# Patient Record
Sex: FEMALE | Race: WHITE | NOT HISPANIC OR LATINO | Employment: FULL TIME | URBAN - NONMETROPOLITAN AREA
[De-identification: names, ages, dates, MRNs, and addresses within clinical notes are randomized per-mention and may not be internally consistent; named-entity substitution may affect disease eponyms.]

---

## 2019-04-09 ENCOUNTER — HISTORICAL (OUTPATIENT)
Dept: ADMINISTRATIVE | Facility: HOSPITAL | Age: 49
End: 2019-04-09

## 2021-01-26 ENCOUNTER — HISTORICAL (OUTPATIENT)
Dept: ADMINISTRATIVE | Facility: HOSPITAL | Age: 51
End: 2021-01-26

## 2021-01-26 LAB
ALBUMIN SERPL-MCNC: 4.4 G/DL (ref 3.8–4.8)
ALBUMIN/GLOB SERPL: 1.9 {RATIO} (ref 1.2–2.2)
ALP SERPL-CCNC: 64 IU/L (ref 39–117)
ALT SERPL-CCNC: 18 IU/L (ref 0–32)
AST SERPL-CCNC: 18 IU/L (ref 0–40)
BASOPHILS # BLD AUTO: 0.2 X10E3/UL (ref 0–0.2)
BASOPHILS NFR BLD AUTO: 2 %
BILIRUB SERPL-MCNC: 0.5 MG/DL (ref 0–1.2)
BUN SERPL-MCNC: 16 MG/DL (ref 6–24)
CALCIUM SERPL-MCNC: 9.7 MG/DL (ref 8.7–10.2)
CHLORIDE SERPL-SCNC: 102 MMOL/L (ref 96–106)
CHOLEST SERPL-MCNC: 132 MG/DL (ref 100–199)
CHOLEST/HDLC SERPL: 1.8 RATIO (ref 0–4.4)
CO2 SERPL-SCNC: 25 MMOL/L (ref 20–29)
CREAT SERPL-MCNC: 0.79 MG/DL (ref 0.57–1)
CREAT/UREA NIT SERPL: 20 (ref 9–23)
EOSINOPHIL # BLD AUTO: 0.2 X10E3/UL (ref 0–0.4)
EOSINOPHIL NFR BLD AUTO: 2 %
ERYTHROCYTE [DISTWIDTH] IN BLOOD BY AUTOMATED COUNT: 13.2 % (ref 11.7–15.4)
GLOBULIN SER-MCNC: 2.3 G/DL (ref 1.5–4.5)
GLUCOSE SERPL-MCNC: 128 MG/DL (ref 65–99)
HBA1C MFR BLD: 6.3 % (ref 4.8–5.6)
HCT VFR BLD AUTO: 44.7 % (ref 34–46.6)
HDLC SERPL-MCNC: 72 MG/DL
HGB BLD-MCNC: 14.7 G/DL (ref 11.1–15.9)
LDLC SERPL CALC-MCNC: 46 MG/DL (ref 0–99)
LYMPHOCYTES # BLD AUTO: 2.3 X10E3/UL (ref 0.7–3.1)
LYMPHOCYTES NFR BLD AUTO: 23 %
MCH RBC QN AUTO: 31.9 PG (ref 26.6–33)
MCHC RBC AUTO-ENTMCNC: 32.9 G/DL (ref 31.5–35.7)
MCV RBC AUTO: 97 FL (ref 79–97)
MONOCYTES # BLD AUTO: 0.7 X10E3/UL (ref 0.1–0.9)
MONOCYTES NFR BLD AUTO: 7 %
NEUTROPHILS # BLD AUTO: 6.6 X10E3/UL (ref 1.4–7)
NEUTROPHILS NFR BLD AUTO: 66 %
PLATELET # BLD AUTO: 350 X10E3/UL (ref 150–450)
POTASSIUM SERPL-SCNC: 4.3 MMOL/L (ref 3.5–5.2)
PROT SERPL-MCNC: 6.7 G/DL (ref 6–8.5)
RBC # BLD AUTO: 4.61 X10(6)/MCL (ref 3.77–5.28)
SODIUM SERPL-SCNC: 140 MMOL/L (ref 134–144)
TRIGL SERPL-MCNC: 71 MG/DL (ref 0–149)
TSH SERPL-ACNC: 1.81 MIU/ML (ref 0.45–4.5)
VLDLC SERPL CALC-MCNC: 14 MG/DL (ref 5–40)
WBC # SPEC AUTO: 10.1 X10E3/UL (ref 3.4–10.8)

## 2022-03-09 DIAGNOSIS — Z12.11 SCREENING FOR MALIGNANT NEOPLASM OF COLON: Primary | ICD-10-CM

## 2022-04-11 ENCOUNTER — HISTORICAL (OUTPATIENT)
Dept: ADMINISTRATIVE | Facility: HOSPITAL | Age: 52
End: 2022-04-11
Payer: COMMERCIAL

## 2022-04-29 VITALS
WEIGHT: 151 LBS | OXYGEN SATURATION: 97 % | HEIGHT: 67 IN | DIASTOLIC BLOOD PRESSURE: 80 MMHG | BODY MASS INDEX: 23.7 KG/M2 | SYSTOLIC BLOOD PRESSURE: 130 MMHG

## 2022-12-19 ENCOUNTER — TELEPHONE (OUTPATIENT)
Dept: GASTROENTEROLOGY | Facility: CLINIC | Age: 52
End: 2022-12-19
Payer: COMMERCIAL

## 2022-12-19 NOTE — TELEPHONE ENCOUNTER
----- Message from Corrine Arthur sent at 2022  3:00 PM CDT -----  Regardinnd referral recd 22, 1st referral recd 22  2nd referral recd 22, 1st referral recd 22    Please call pt to Direct Schedule New Pt RMM    Thanks,  Corrine

## 2022-12-19 NOTE — TELEPHONE ENCOUNTER
Reached out to pt to schedule colon w/ RMM and update Epic chart. No answer. LVM for pt to return call - VL

## 2023-02-03 DIAGNOSIS — F41.1 GAD (GENERALIZED ANXIETY DISORDER): Primary | ICD-10-CM

## 2023-02-03 DIAGNOSIS — E78.2 MIXED HYPERLIPIDEMIA: ICD-10-CM

## 2023-02-03 RX ORDER — BUPROPION HYDROCHLORIDE 150 MG/1
150 TABLET ORAL DAILY
COMMUNITY
Start: 2022-12-05 | End: 2023-02-03 | Stop reason: SDUPTHER

## 2023-02-03 RX ORDER — ROSUVASTATIN CALCIUM 5 MG/1
5 TABLET, COATED ORAL DAILY
Qty: 30 TABLET | Refills: 11 | Status: SHIPPED | OUTPATIENT
Start: 2023-02-03 | End: 2023-04-21 | Stop reason: SDUPTHER

## 2023-02-03 RX ORDER — ROSUVASTATIN CALCIUM 5 MG/1
5 TABLET, COATED ORAL
COMMUNITY
Start: 2022-12-05 | End: 2023-02-03 | Stop reason: SDUPTHER

## 2023-02-03 RX ORDER — MELOXICAM 7.5 MG/1
7.5 TABLET ORAL 2 TIMES DAILY
COMMUNITY
Start: 2022-12-05 | End: 2023-03-02

## 2023-02-03 RX ORDER — BUPROPION HYDROCHLORIDE 150 MG/1
150 TABLET ORAL DAILY
Qty: 30 TABLET | Refills: 11 | Status: SHIPPED | OUTPATIENT
Start: 2023-02-03 | End: 2023-04-21 | Stop reason: SDUPTHER

## 2023-03-24 PROCEDURE — 80061 LIPID PANEL: CPT | Performed by: FAMILY MEDICINE

## 2023-03-24 PROCEDURE — 83036 HEMOGLOBIN GLYCOSYLATED A1C: CPT | Performed by: FAMILY MEDICINE

## 2023-03-24 PROCEDURE — 80053 COMPREHEN METABOLIC PANEL: CPT | Performed by: FAMILY MEDICINE

## 2023-03-24 PROCEDURE — 82607 VITAMIN B-12: CPT | Performed by: FAMILY MEDICINE

## 2023-03-24 PROCEDURE — 85025 COMPLETE CBC W/AUTO DIFF WBC: CPT | Performed by: FAMILY MEDICINE

## 2023-04-21 ENCOUNTER — OFFICE VISIT (OUTPATIENT)
Dept: FAMILY MEDICINE | Facility: CLINIC | Age: 53
End: 2023-04-21
Payer: COMMERCIAL

## 2023-04-21 VITALS
SYSTOLIC BLOOD PRESSURE: 120 MMHG | DIASTOLIC BLOOD PRESSURE: 68 MMHG | TEMPERATURE: 97 F | BODY MASS INDEX: 24.8 KG/M2 | WEIGHT: 158 LBS | HEIGHT: 67 IN

## 2023-04-21 DIAGNOSIS — E78.00 HYPERCHOLESTEROLEMIA: ICD-10-CM

## 2023-04-21 DIAGNOSIS — M77.9 INFLAMMATION AROUND JOINT: ICD-10-CM

## 2023-04-21 DIAGNOSIS — E78.2 MIXED HYPERLIPIDEMIA: ICD-10-CM

## 2023-04-21 DIAGNOSIS — F41.1 GAD (GENERALIZED ANXIETY DISORDER): ICD-10-CM

## 2023-04-21 DIAGNOSIS — M65.342 TRIGGER FINGER, LEFT RING FINGER: ICD-10-CM

## 2023-04-21 DIAGNOSIS — E11.69 TYPE 2 DIABETES MELLITUS WITH HYPERCHOLESTEROLEMIA: Primary | ICD-10-CM

## 2023-04-21 DIAGNOSIS — I10 PRIMARY HYPERTENSION: ICD-10-CM

## 2023-04-21 DIAGNOSIS — E11.9 CONTROLLED TYPE 2 DIABETES MELLITUS WITHOUT COMPLICATION, WITH LONG-TERM CURRENT USE OF INSULIN: ICD-10-CM

## 2023-04-21 DIAGNOSIS — Z79.4 CONTROLLED TYPE 2 DIABETES MELLITUS WITHOUT COMPLICATION, WITH LONG-TERM CURRENT USE OF INSULIN: ICD-10-CM

## 2023-04-21 DIAGNOSIS — E66.9 OBESITY WITHOUT SERIOUS COMORBIDITY, UNSPECIFIED CLASSIFICATION, UNSPECIFIED OBESITY TYPE: ICD-10-CM

## 2023-04-21 DIAGNOSIS — E78.00 TYPE 2 DIABETES MELLITUS WITH HYPERCHOLESTEROLEMIA: Primary | ICD-10-CM

## 2023-04-21 PROCEDURE — 99214 OFFICE O/P EST MOD 30 MIN: CPT | Mod: 25,,, | Performed by: FAMILY MEDICINE

## 2023-04-21 PROCEDURE — 20550 NJX 1 TENDON SHEATH/LIGAMENT: CPT | Mod: F4,,, | Performed by: FAMILY MEDICINE

## 2023-04-21 PROCEDURE — 99214 PR OFFICE/OUTPT VISIT, EST, LEVL IV, 30-39 MIN: ICD-10-PCS | Mod: 25,,, | Performed by: FAMILY MEDICINE

## 2023-04-21 PROCEDURE — 20550 PR INJECT TENDON SHEATH/LIGAMENT: ICD-10-PCS | Mod: F4,,, | Performed by: FAMILY MEDICINE

## 2023-04-21 RX ORDER — MELOXICAM 7.5 MG/1
7.5 TABLET ORAL 2 TIMES DAILY
Qty: 60 TABLET | Refills: 11 | Status: SHIPPED | OUTPATIENT
Start: 2023-04-21 | End: 2023-05-03 | Stop reason: SDUPTHER

## 2023-04-21 RX ORDER — ROSUVASTATIN CALCIUM 5 MG/1
5 TABLET, COATED ORAL DAILY
Qty: 30 TABLET | Refills: 11 | Status: SHIPPED | OUTPATIENT
Start: 2023-04-21 | End: 2023-05-03 | Stop reason: SDUPTHER

## 2023-04-21 RX ORDER — ESTRADIOL 1 MG/1
1 TABLET ORAL
COMMUNITY
Start: 2023-03-30 | End: 2023-11-06

## 2023-04-21 RX ORDER — BUPROPION HYDROCHLORIDE 150 MG/1
150 TABLET ORAL DAILY
Qty: 30 TABLET | Refills: 11 | Status: SHIPPED | OUTPATIENT
Start: 2023-04-21 | End: 2023-05-03 | Stop reason: SDUPTHER

## 2023-04-21 RX ORDER — PHENTERMINE HYDROCHLORIDE 37.5 MG/1
37.5 TABLET ORAL EVERY MORNING
Qty: 30 TABLET | Refills: 2 | Status: SHIPPED | OUTPATIENT
Start: 2023-04-21 | End: 2023-07-27 | Stop reason: SDUPTHER

## 2023-04-21 RX ORDER — METFORMIN HYDROCHLORIDE 500 MG/1
500 TABLET, EXTENDED RELEASE ORAL DAILY
Qty: 30 TABLET | Refills: 11 | Status: SHIPPED | OUTPATIENT
Start: 2023-04-21 | End: 2023-05-03 | Stop reason: SDUPTHER

## 2023-04-21 NOTE — PROGRESS NOTES
"SUBJECTIVE:  HPI    Phoebe Arthur is a 52 y.o. female here for Follow-up (6 months labs).  She is doing well.  No problems, questions, concerns other than recurrent triggering of the left ring finger.  She had an injection performed in August of 2022 and has had no recurrence until recently.  She requests another injection today.    She denies any chest pain, shortness a breath, abdominal pain.      Phoebe's allergies, medications, history, and problem list were updated as appropriate.    ROS:  Pertinent ROS as above, otherwise negative    OBJECTIVE:  Vital signs  Visit Vitals  /68 (BP Location: Left arm)   Temp 96.7 °F (35.9 °C) (Temporal)   Ht 5' 6.54" (1.69 m)   Wt 71.7 kg (158 lb)   BMI 25.09 kg/m²          PHYSICAL EXAM:  General:  Awake, alert, no acute distress   Musculoskeletal:  Left ring finger with palpable flexor nodule just proximal to the metacarpal head clicking and triggering present.    Chemistry:  Lab Results   Component Value Date     03/24/2023    K 5.2 (H) 03/24/2023    BUN 16.0 03/24/2023    CREATININE 0.60 (L) 03/24/2023    EGFRNORACEVR >90 03/24/2023    GLUCOSE 129 (H) 03/24/2023    CALCIUM 10.0 03/24/2023    ALKPHOS 81 03/24/2023    AST 31 03/24/2023    ALT 22 03/24/2023    TSH 1.810 01/26/2021        Lab Results   Component Value Date    HGBA1C 5.8 03/24/2023        Hematology:  Lab Results   Component Value Date    WBC 10.0 03/24/2023    HGB 14.6 03/24/2023    MCV 91.1 03/24/2023     03/24/2023       Lipid Panel:  Lab Results   Component Value Date    CHOL 137 03/24/2023    HDL 91 (H) 03/24/2023    DLDL 32.3 03/24/2023    TRIG 64 03/24/2023    TOTALCHOLEST 1.8 01/26/2021        ASSESSMENT/PLAN:  1. Type 2 diabetes mellitus with hypercholesterolemia  Overview:  Lab Results   Component Value Date    HGBA1C 5.8 03/24/2023         Assessment & Plan:  On metformin  mg daily    Orders:  -     Hemoglobin A1C; Future; Expected date: 10/21/2023  -     Vitamin B12; " Future; Expected date: 10/21/2023    2. Hypercholesterolemia  Overview:  Lab Results   Component Value Date    CHOL 137 03/24/2023    CHOL 132 01/26/2021     Lab Results   Component Value Date    HDL 91 (H) 03/24/2023    HDL 72 01/26/2021     No results found for: LDLCALC  Lab Results   Component Value Date    DLDL 32.3 03/24/2023     Lab Results   Component Value Date    TRIG 64 03/24/2023    TRIG 71 01/26/2021       f1 No results found for: CHOLHDL      Assessment & Plan:  LDL cholesterol at goal of less than 70 on rosuvastatin 5 mg daily    Orders:  -     Lipid Panel; Future; Expected date: 10/21/2023  -     Vitamin B12; Future; Expected date: 10/21/2023    3. Primary hypertension  Assessment & Plan:  Controlled without meds at this time    Orders:  -     Comprehensive Metabolic Panel; Future; Expected date: 10/21/2023  -     CBC Auto Differential; Future; Expected date: 10/21/2023  -     Vitamin B12; Future; Expected date: 10/21/2023    4. Trigger finger, left ring finger  Assessment & Plan:  Injected today    Aftercare instructions discussed      5. MONISHA (generalized anxiety disorder)  -     buPROPion (WELLBUTRIN XL) 150 MG TB24 tablet; Take 1 tablet (150 mg total) by mouth once daily.  Dispense: 30 tablet; Refill: 11    6. Inflammation around joint  -     meloxicam (MOBIC) 7.5 MG tablet; Take 1 tablet (7.5 mg total) by mouth 2 (two) times daily.  Dispense: 60 tablet; Refill: 11    7. Controlled type 2 diabetes mellitus without complication, with long-term current use of insulin  -     metFORMIN (GLUCOPHAGE-XR) 500 MG ER 24hr tablet; Take 1 tablet (500 mg total) by mouth once daily.  Dispense: 30 tablet; Refill: 11    8. Obesity without serious comorbidity, unspecified classification, unspecified obesity type  -     phentermine (ADIPEX-P) 37.5 mg tablet; Take 1 tablet (37.5 mg total) by mouth every morning.  Dispense: 30 tablet; Refill: 2    9. Mixed hyperlipidemia  -     rosuvastatin (CRESTOR) 5 MG tablet; Take  1 tablet (5 mg total) by mouth once daily.  Dispense: 30 tablet; Refill: 11            Follow Up:  Follow up in about 6 months (around 10/21/2023) for Fasting labs, Follow-up.

## 2023-04-21 NOTE — PROCEDURES
Left ring finger palmar surface prepped in sterile fashion.  Injected flexor tendon nodule with 1 mL of lidocaine 1% plain and 40 mg of Depo-Medrol.  Patient tolerated procedure well.

## 2023-04-24 RX ORDER — METHYLPREDNISOLONE ACETATE 40 MG/ML
40 INJECTION, SUSPENSION INTRA-ARTICULAR; INTRALESIONAL; INTRAMUSCULAR; SOFT TISSUE
Status: SHIPPED | OUTPATIENT
Start: 2023-04-24

## 2023-05-03 DIAGNOSIS — E66.9 OBESITY WITHOUT SERIOUS COMORBIDITY, UNSPECIFIED CLASSIFICATION, UNSPECIFIED OBESITY TYPE: ICD-10-CM

## 2023-05-03 DIAGNOSIS — Z79.4 CONTROLLED TYPE 2 DIABETES MELLITUS WITHOUT COMPLICATION, WITH LONG-TERM CURRENT USE OF INSULIN: ICD-10-CM

## 2023-05-03 DIAGNOSIS — E11.9 CONTROLLED TYPE 2 DIABETES MELLITUS WITHOUT COMPLICATION, WITH LONG-TERM CURRENT USE OF INSULIN: ICD-10-CM

## 2023-05-03 DIAGNOSIS — E78.2 MIXED HYPERLIPIDEMIA: ICD-10-CM

## 2023-05-03 DIAGNOSIS — F41.1 GAD (GENERALIZED ANXIETY DISORDER): ICD-10-CM

## 2023-05-03 DIAGNOSIS — M77.9 INFLAMMATION AROUND JOINT: ICD-10-CM

## 2023-05-03 RX ORDER — BUPROPION HYDROCHLORIDE 150 MG/1
150 TABLET ORAL DAILY
Qty: 30 TABLET | Refills: 11 | Status: SHIPPED | OUTPATIENT
Start: 2023-05-03 | End: 2023-06-05 | Stop reason: SDUPTHER

## 2023-05-03 RX ORDER — ROSUVASTATIN CALCIUM 5 MG/1
5 TABLET, COATED ORAL DAILY
Qty: 30 TABLET | Refills: 11 | Status: SHIPPED | OUTPATIENT
Start: 2023-05-03 | End: 2023-06-05 | Stop reason: SDUPTHER

## 2023-05-03 RX ORDER — PHENTERMINE HYDROCHLORIDE 37.5 MG/1
37.5 TABLET ORAL EVERY MORNING
Qty: 30 TABLET | Refills: 2 | OUTPATIENT
Start: 2023-05-03

## 2023-05-03 RX ORDER — METFORMIN HYDROCHLORIDE 500 MG/1
500 TABLET, EXTENDED RELEASE ORAL DAILY
Qty: 30 TABLET | Refills: 11 | Status: SHIPPED | OUTPATIENT
Start: 2023-05-03 | End: 2023-06-05 | Stop reason: SDUPTHER

## 2023-05-03 RX ORDER — MELOXICAM 7.5 MG/1
7.5 TABLET ORAL 2 TIMES DAILY
Qty: 60 TABLET | Refills: 11 | Status: SHIPPED | OUTPATIENT
Start: 2023-05-03 | End: 2023-06-05 | Stop reason: SDUPTHER

## 2023-06-05 DIAGNOSIS — F41.1 GAD (GENERALIZED ANXIETY DISORDER): ICD-10-CM

## 2023-06-05 DIAGNOSIS — E78.2 MIXED HYPERLIPIDEMIA: ICD-10-CM

## 2023-06-05 DIAGNOSIS — Z79.4 CONTROLLED TYPE 2 DIABETES MELLITUS WITHOUT COMPLICATION, WITH LONG-TERM CURRENT USE OF INSULIN: ICD-10-CM

## 2023-06-05 DIAGNOSIS — E11.9 CONTROLLED TYPE 2 DIABETES MELLITUS WITHOUT COMPLICATION, WITH LONG-TERM CURRENT USE OF INSULIN: ICD-10-CM

## 2023-06-05 DIAGNOSIS — E66.9 OBESITY WITHOUT SERIOUS COMORBIDITY, UNSPECIFIED CLASSIFICATION, UNSPECIFIED OBESITY TYPE: ICD-10-CM

## 2023-06-05 DIAGNOSIS — M77.9 INFLAMMATION AROUND JOINT: ICD-10-CM

## 2023-06-05 RX ORDER — BUPROPION HYDROCHLORIDE 150 MG/1
150 TABLET ORAL DAILY
Qty: 30 TABLET | Refills: 11 | Status: SHIPPED | OUTPATIENT
Start: 2023-06-05

## 2023-06-05 RX ORDER — METFORMIN HYDROCHLORIDE 500 MG/1
500 TABLET, EXTENDED RELEASE ORAL DAILY
Qty: 30 TABLET | Refills: 11 | Status: SHIPPED | OUTPATIENT
Start: 2023-06-05

## 2023-06-05 RX ORDER — ROSUVASTATIN CALCIUM 5 MG/1
5 TABLET, COATED ORAL DAILY
Qty: 30 TABLET | Refills: 11 | Status: SHIPPED | OUTPATIENT
Start: 2023-06-05

## 2023-06-05 RX ORDER — PHENTERMINE HYDROCHLORIDE 37.5 MG/1
37.5 TABLET ORAL EVERY MORNING
Qty: 30 TABLET | Refills: 2 | Status: CANCELLED | OUTPATIENT
Start: 2023-06-05

## 2023-06-05 RX ORDER — MELOXICAM 7.5 MG/1
7.5 TABLET ORAL 2 TIMES DAILY
Qty: 60 TABLET | Refills: 11 | Status: SHIPPED | OUTPATIENT
Start: 2023-06-05

## 2023-07-27 DIAGNOSIS — E66.9 OBESITY WITHOUT SERIOUS COMORBIDITY, UNSPECIFIED CLASSIFICATION, UNSPECIFIED OBESITY TYPE: ICD-10-CM

## 2023-07-27 RX ORDER — PHENTERMINE HYDROCHLORIDE 37.5 MG/1
37.5 TABLET ORAL EVERY MORNING
Qty: 30 TABLET | Refills: 2 | Status: SHIPPED | OUTPATIENT
Start: 2023-07-27 | End: 2023-12-07

## 2023-10-20 ENCOUNTER — OFFICE VISIT (OUTPATIENT)
Dept: FAMILY MEDICINE | Facility: CLINIC | Age: 53
End: 2023-10-20
Payer: COMMERCIAL

## 2023-10-20 VITALS
HEART RATE: 81 BPM | HEIGHT: 67 IN | DIASTOLIC BLOOD PRESSURE: 72 MMHG | TEMPERATURE: 98 F | WEIGHT: 156.19 LBS | BODY MASS INDEX: 24.52 KG/M2 | SYSTOLIC BLOOD PRESSURE: 128 MMHG | OXYGEN SATURATION: 98 %

## 2023-10-20 DIAGNOSIS — Z12.31 SCREENING MAMMOGRAM FOR BREAST CANCER: ICD-10-CM

## 2023-10-20 DIAGNOSIS — Z12.11 SCREENING FOR COLON CANCER: ICD-10-CM

## 2023-10-20 DIAGNOSIS — E78.00 TYPE 2 DIABETES MELLITUS WITH HYPERCHOLESTEROLEMIA: ICD-10-CM

## 2023-10-20 DIAGNOSIS — M65.342 TRIGGER FINGER, LEFT RING FINGER: Primary | ICD-10-CM

## 2023-10-20 DIAGNOSIS — E11.69 TYPE 2 DIABETES MELLITUS WITH HYPERCHOLESTEROLEMIA: ICD-10-CM

## 2023-10-20 PROCEDURE — 3074F SYST BP LT 130 MM HG: CPT | Mod: CPTII,,, | Performed by: FAMILY MEDICINE

## 2023-10-20 PROCEDURE — 1159F MED LIST DOCD IN RCRD: CPT | Mod: CPTII,,, | Performed by: FAMILY MEDICINE

## 2023-10-20 PROCEDURE — 20550 TENDON SHEATH: ICD-10-PCS | Mod: LT,,, | Performed by: FAMILY MEDICINE

## 2023-10-20 PROCEDURE — 1159F PR MEDICATION LIST DOCUMENTED IN MEDICAL RECORD: ICD-10-PCS | Mod: CPTII,,, | Performed by: FAMILY MEDICINE

## 2023-10-20 PROCEDURE — 99213 PR OFFICE/OUTPT VISIT, EST, LEVL III, 20-29 MIN: ICD-10-PCS | Mod: 25,,, | Performed by: FAMILY MEDICINE

## 2023-10-20 PROCEDURE — 3044F HG A1C LEVEL LT 7.0%: CPT | Mod: CPTII,,, | Performed by: FAMILY MEDICINE

## 2023-10-20 PROCEDURE — 99213 OFFICE O/P EST LOW 20 MIN: CPT | Mod: 25,,, | Performed by: FAMILY MEDICINE

## 2023-10-20 PROCEDURE — 3008F PR BODY MASS INDEX (BMI) DOCUMENTED: ICD-10-PCS | Mod: CPTII,,, | Performed by: FAMILY MEDICINE

## 2023-10-20 PROCEDURE — 3044F PR MOST RECENT HEMOGLOBIN A1C LEVEL <7.0%: ICD-10-PCS | Mod: CPTII,,, | Performed by: FAMILY MEDICINE

## 2023-10-20 PROCEDURE — 1160F RVW MEDS BY RX/DR IN RCRD: CPT | Mod: CPTII,,, | Performed by: FAMILY MEDICINE

## 2023-10-20 PROCEDURE — 3078F PR MOST RECENT DIASTOLIC BLOOD PRESSURE < 80 MM HG: ICD-10-PCS | Mod: CPTII,,, | Performed by: FAMILY MEDICINE

## 2023-10-20 PROCEDURE — 1160F PR REVIEW ALL MEDS BY PRESCRIBER/CLIN PHARMACIST DOCUMENTED: ICD-10-PCS | Mod: CPTII,,, | Performed by: FAMILY MEDICINE

## 2023-10-20 PROCEDURE — 20550 NJX 1 TENDON SHEATH/LIGAMENT: CPT | Mod: LT,,, | Performed by: FAMILY MEDICINE

## 2023-10-20 PROCEDURE — 3074F PR MOST RECENT SYSTOLIC BLOOD PRESSURE < 130 MM HG: ICD-10-PCS | Mod: CPTII,,, | Performed by: FAMILY MEDICINE

## 2023-10-20 PROCEDURE — 3078F DIAST BP <80 MM HG: CPT | Mod: CPTII,,, | Performed by: FAMILY MEDICINE

## 2023-10-20 PROCEDURE — 3008F BODY MASS INDEX DOCD: CPT | Mod: CPTII,,, | Performed by: FAMILY MEDICINE

## 2023-10-20 NOTE — PROGRESS NOTES
"SUBJECTIVE:  HPI    Phoebe Arthur is a 52 y.o. female here for Finger Pain (Left ring finger ).  She had really good relief of her trigger finger of the left ring finger after an injection in April.  However, she began having pain and triggering again within the last 1-2 weeks.      Terrys allergies, medications, history, and problem list were updated as appropriate.    ROS:  Pertinent ROS as above, otherwise negative    OBJECTIVE:  Vital signs  Visit Vitals  /72 (BP Location: Left arm, Patient Position: Sitting, BP Method: Medium (Manual))   Pulse 81   Temp 98.4 °F (36.9 °C) (Oral)   Ht 5' 6.54" (1.69 m)   Wt 70.9 kg (156 lb 3.2 oz)   SpO2 98%   BMI 24.81 kg/m²          PHYSICAL EXAM:  Left hand:  Palpable flexor nodule and tenderness to palpation with triggering of the left ring finger.      ASSESSMENT/PLAN:  1. Trigger finger, left ring finger  Assessment & Plan:  Injected today    Aftercare instructions discussed    Orders:  -     Tendon Sheath    2. Screening for colon cancer  -     Ambulatory referral/consult to [facility] COLONOSCOPY; Future; Expected date: 10/27/2023    3. Screening mammogram for breast cancer  -     Mammo Digital Screening Bilat w/ [facility]; Future; Expected date: 10/20/2023    4. Type 2 diabetes mellitus with hypercholesterolemia  Overview:  Lab Results   Component Value Date    HGBA1C 5.8 03/24/2023         Orders:  -     Ambulatory referral/consult to Optometry; Future; Expected date: 10/27/2023      "

## 2023-10-20 NOTE — PROCEDURES
Tendon Sheath    Date/Time: 10/20/2023 10:32 AM    Performed by: Hernan Barr MD  Authorized by: Hernan Barr MD    Consent Done?:  Yes (Verbal)  Local anesthetic:  Lidocaine 1% without epinephrine  Anesthetic total (ml):  1    Location:  Ring finger  Site:  L ring flexor tendon sheath  Needle size:  25 G

## 2023-10-23 ENCOUNTER — PATIENT MESSAGE (OUTPATIENT)
Dept: FAMILY MEDICINE | Facility: CLINIC | Age: 53
End: 2023-10-23
Payer: COMMERCIAL

## 2023-10-30 LAB
ALBUMIN SERPL-MCNC: 4.5 G/DL (ref 3.8–4.9)
ALBUMIN/GLOB SERPL: 2.3 {RATIO} (ref 1.2–2.2)
ALP SERPL-CCNC: 64 IU/L (ref 44–121)
ALT SERPL-CCNC: 29 IU/L (ref 0–32)
AST SERPL-CCNC: 27 IU/L (ref 0–40)
BASOPHILS # BLD AUTO: 0.2 X10E3/UL (ref 0–0.2)
BASOPHILS NFR BLD AUTO: 1 %
BILIRUB SERPL-MCNC: 0.3 MG/DL (ref 0–1.2)
BUN SERPL-MCNC: 18 MG/DL (ref 6–24)
BUN/CREAT SERPL: 25 (ref 9–23)
CALCIUM SERPL-MCNC: 9.2 MG/DL (ref 8.7–10.2)
CHLORIDE SERPL-SCNC: 100 MMOL/L (ref 96–106)
CHOLEST SERPL-MCNC: 120 MG/DL (ref 100–199)
CO2 SERPL-SCNC: 25 MMOL/L (ref 20–29)
CREAT SERPL-MCNC: 0.72 MG/DL (ref 0.57–1)
EOSINOPHIL # BLD AUTO: 0.3 X10E3/UL (ref 0–0.4)
EOSINOPHIL NFR BLD AUTO: 2 %
ERYTHROCYTE [DISTWIDTH] IN BLOOD BY AUTOMATED COUNT: 13.9 % (ref 11.7–15.4)
EST. GFR  (NO RACE VARIABLE): 101 ML/MIN/1.73
GLOBULIN SER CALC-MCNC: 2 G/DL (ref 1.5–4.5)
GLUCOSE SERPL-MCNC: 85 MG/DL (ref 70–99)
HBA1C MFR BLD: 5.8 % (ref 4.8–5.6)
HCT VFR BLD AUTO: 43.8 % (ref 34–46.6)
HDLC SERPL-MCNC: 76 MG/DL
HGB BLD-MCNC: 14.2 G/DL (ref 11.1–15.9)
IMM GRANULOCYTES NFR BLD AUTO: 1 %
LDLC SERPL CALC-MCNC: 30 MG/DL (ref 0–99)
LYMPHOCYTES # BLD AUTO: 3.1 X10E3/UL (ref 0.7–3.1)
LYMPHOCYTES NFR BLD AUTO: 25 %
MCH RBC QN AUTO: 31.6 PG (ref 26.6–33)
MCHC RBC AUTO-ENTMCNC: 32.4 G/DL (ref 31.5–35.7)
MCV RBC AUTO: 97 FL (ref 79–97)
MONOCYTES # BLD AUTO: 1 X10E3/UL (ref 0.1–0.9)
MONOCYTES NFR BLD AUTO: 8 %
NEUTROPHILS # BLD AUTO: 7.9 X10E3/UL (ref 1.4–7)
NEUTROPHILS NFR BLD AUTO: 63 %
PLATELET # BLD AUTO: 320 X10E3/UL (ref 150–450)
POTASSIUM SERPL-SCNC: 4.4 MMOL/L (ref 3.5–5.2)
PROT SERPL-MCNC: 6.5 G/DL (ref 6–8.5)
RBC # BLD AUTO: 4.5 X10E6/UL (ref 3.77–5.28)
SODIUM SERPL-SCNC: 140 MMOL/L (ref 134–144)
TRIGL SERPL-MCNC: 70 MG/DL (ref 0–149)
VIT B12 SERPL-MCNC: 527 PG/ML (ref 232–1245)
VLDLC SERPL CALC-MCNC: 14 MG/DL (ref 5–40)
WBC # BLD AUTO: 12.6 X10E3/UL (ref 3.4–10.8)

## 2023-11-02 DIAGNOSIS — Z79.890 HORMONE REPLACEMENT THERAPY: Primary | ICD-10-CM

## 2023-11-06 RX ORDER — ESTRADIOL 1 MG/1
1 TABLET ORAL
Qty: 30 TABLET | Refills: 11 | Status: SHIPPED | OUTPATIENT
Start: 2023-11-06 | End: 2024-01-29 | Stop reason: SDUPTHER

## 2023-12-07 DIAGNOSIS — E66.9 OBESITY WITHOUT SERIOUS COMORBIDITY, UNSPECIFIED CLASSIFICATION, UNSPECIFIED OBESITY TYPE: ICD-10-CM

## 2023-12-07 RX ORDER — PHENTERMINE HYDROCHLORIDE 37.5 MG/1
37.5 TABLET ORAL EVERY MORNING
Qty: 30 TABLET | Refills: 2 | Status: SHIPPED | OUTPATIENT
Start: 2023-12-07 | End: 2024-03-01

## 2023-12-15 LAB
LEFT EYE DM RETINOPATHY: NEGATIVE
RIGHT EYE DM RETINOPATHY: NEGATIVE

## 2023-12-18 ENCOUNTER — DOCUMENTATION ONLY (OUTPATIENT)
Dept: ADMINISTRATIVE | Facility: HOSPITAL | Age: 53
End: 2023-12-18
Payer: COMMERCIAL

## 2023-12-18 NOTE — PROGRESS NOTES
Population Health Chart Review & Patient Outreach Details    Health Maintenance Topics Addressed and Outreach Outcomes / Actions Taken:            Diabetic Eye Exam []  Eye Exam Screening Order Placed    []  Eye Camera Scheduled or Optometry/Ophthalmology Referral Placed    []  External Records Requested & Care Team Updated if Applicable    [x]  External Records Uploaded, Care Team Updated, & History Updated if Applicable    []  Patient Declined Scheduling Eye Exam    []  Patient Will Schedule with External Provider & Care Team Updated if Applicable            Lesli Arthur MA - Panel Care Coordinator

## 2023-12-28 ENCOUNTER — TELEPHONE (OUTPATIENT)
Dept: FAMILY MEDICINE | Facility: CLINIC | Age: 53
End: 2023-12-28
Payer: COMMERCIAL

## 2023-12-29 ENCOUNTER — HOSPITAL ENCOUNTER (OUTPATIENT)
Dept: RADIOLOGY | Facility: HOSPITAL | Age: 53
Discharge: HOME | End: 2023-12-29
Attending: FAMILY MEDICINE
Payer: COMMERCIAL

## 2023-12-29 DIAGNOSIS — Z12.31 SCREENING MAMMOGRAM FOR BREAST CANCER: ICD-10-CM

## 2023-12-29 PROCEDURE — 77067 SCR MAMMO BI INCL CAD: CPT | Mod: TC

## 2024-01-09 ENCOUNTER — TELEPHONE (OUTPATIENT)
Dept: FAMILY MEDICINE | Facility: CLINIC | Age: 54
End: 2024-01-09
Payer: COMMERCIAL

## 2024-01-09 NOTE — TELEPHONE ENCOUNTER
----- Message from Hernan Barr MD sent at 1/9/2024 11:58 AM CST -----  Mammogram okay.  Repeat 1 year.

## 2024-01-17 NOTE — PROGRESS NOTES
Chief Complaint: Annual exam    Chief Complaint   Patient presents with    Well Woman       HPI:   53 y.o. F  presents for an annual gyn exam. S/p VINNIE. Denies vaginal bleeding. Currently taking Estradiol 1mg for HRT. C/o vaginal dryness, hot flashes, and night sweats.     MM2024 negative  Colonoscopy:     FmHx: BRANDYN arreola breast cancer. Denies uterine, ovarian, and colon cancers.     MENOPAUSAL:  Age at menarche: 13  Age at menopause: 48  Hysterectomy:  Yes  Last pap: 10/22/2021 WNL  H/o abnl pap: none  Postcoital Bleeding: No  Sexually Active: yes   Dyspareunia: No  H/o STI: No   HRT: Yes, Estradiol 1mg  MM2024 Benign  H/o abnl MMG: none   Colonoscopy: yes,        Family History   Problem Relation Age of Onset    Breast cancer Maternal Aunt 50    Colon cancer Neg Hx     Ovarian cancer Neg Hx     Uterine cancer Neg Hx     Cervical cancer Neg Hx          No past medical history on file.  Past Surgical History:   Procedure Laterality Date    CHOLECYSTECTOMY      TOTAL ABDOMINAL HYSTERECTOMY         Current Outpatient Medications:     buPROPion (WELLBUTRIN XL) 150 MG TB24 tablet, Take 1 tablet (150 mg total) by mouth once daily., Disp: 30 tablet, Rfl: 11    estradioL (ESTRACE) 1 MG tablet, TAKE ONE TABLET BY MOUTH EVERY DAY, Disp: 30 tablet, Rfl: 11    meloxicam (MOBIC) 7.5 MG tablet, Take 1 tablet (7.5 mg total) by mouth 2 (two) times daily., Disp: 60 tablet, Rfl: 11    metFORMIN (GLUCOPHAGE-XR) 500 MG ER 24hr tablet, Take 1 tablet (500 mg total) by mouth once daily., Disp: 30 tablet, Rfl: 11    phentermine (ADIPEX-P) 37.5 mg tablet, TAKE ONE TABLET BY MOUTH IN THE MORNING EVERY DAY, Disp: 30 tablet, Rfl: 2    rosuvastatin (CRESTOR) 5 MG tablet, Take 1 tablet (5 mg total) by mouth once daily., Disp: 30 tablet, Rfl: 11    Current Facility-Administered Medications:     methylPREDNISolone acetate injection 40 mg, 40 mg, Intra-articular, 1 time in Clinic/HOD, Hernan Barr,  "MD    Review of patient's allergies indicates:   Allergen Reactions    Iodine Hives       Social History     Tobacco Use    Smoking status: Former     Current packs/day: 0.00     Types: Cigarettes     Quit date: 2018     Years since quittin.0     Passive exposure: Current    Smokeless tobacco: Never   Substance Use Topics    Alcohol use: Yes     Alcohol/week: 3.0 standard drinks of alcohol     Types: 3 Drinks containing 0.5 oz of alcohol per week    Drug use: Never       Review of Systems:  General/Constitutional: Chills denies. Fatigue/weakness denies. Fever denies. Night sweats admits. Hot flashes admits    Respiratory: Cough denies. Hemoptysis denies. SOB denies. Sputum production denies. Wheezing denies .   Cardiovascular: Chest pain denies . Dizziness denies. Palpitations denies. Swelling in hands/feet denies    Gastrointestinal: Abdominal pain denies. Blood in stool denies. Constipation denies. Diarrhea denies. Heartburn denies. Nausea denies. Vomiting denies    Genitourinary: Incontinence denies. Blood in urine denies. Frequent urination denies. Painful urination denies. Urinary urgency denies. Nocturia denies    Gynecologic: Irregular menses denies. Heavy bleeding denies. Painful menses denies. Vaginal discharge denies. Vaginal odor denies. Vaginal itching denies. Vaginal lesion denies. Pelvic pain denies. Decreased libido denies. Vulvar lesion denies. Prolapse of genital organs denies. Painful intercourse denies. Postcoital bleeding denies. Vaginal dryness admits  Psychiatric: Depression denies. Anxiety denies     Physical Exam:   Vitals:    24 1535   BP: 130/70   BP Location: Left arm   Patient Position: Sitting   BP Method: Medium (Manual)   Temp: 97.2 °F (36.2 °C)   TempSrc: Temporal   Weight: 71.7 kg (158 lb)   Height: 5' 7" (1.702 m)       Body mass index is 24.75 kg/m².       Chaperone: present.     General appearance: healthy, well-nourished and well-developed     Psychiatric: Orientation " to time, place and person. Normal mood and affect and active, alert     Skin: Appearance: no rashes or lesions.     Neck:   Neck: supple, FROM, trachea midline. and no masses   Thyroid: no enlargement or nodules and non-tender.       Cardiovascular:   Auscultation: RRR and no murmur.   Peripheral Vascular: no varicosities, LLE edema, RLE edema, calf tenderness, and palpable cord and pedal pulses intact.     Lungs:   Respiratory effort: no intercostal retractions or accessory muscle usage.   Auscultation: no wheezing, rales/crackles, or rhonchi and clear to auscultation.     Breast:   Inspection/Palpation: no tenderness, discrete/distinct masses, skin changes, or abnormal secretions. Nipple appearance normal. +FBC changes    Abdomen:   Auscultation/Inspection/Palpation: no hepatomegaly, splenomegaly, masses, tenderness or CVA tenderness and soft, non-distended bowel sounds preset.    Hernia: no palpable hernias.     Female Genitalia:    Vulva: no masses, tenderness or lesions    Bladder/Urethra: no urethral discharge or mass, normal meatus, bladder non-distended.    Vagina: no tenderness, erythema, cystocele, rectocele, abnormal vaginal discharge or vesicle(s) or ulcers. Cuff intact. Atrophic  Bimanual: non tender, no masses.        Lymph Nodes:   Palpation: non tender submandibular nodes, axillary nodes, or inguinal nodes.     Rectal Exam:   Rectum: normal perianal skin.       Assessment:     Patient Active Problem List   Diagnosis    Type 2 diabetes mellitus with hypercholesterolemia    Hypercholesterolemia    Primary hypertension    Trigger finger, left ring finger       Health Maintenance Due   Topic Date Due    Hepatitis C Screening  Never done    COVID-19 Vaccine (1) Never done    Diabetes Urine Screening  Never done    Pneumococcal Vaccines (Age 0-64) (1 of 2 - PCV) Never done    Foot Exam  Never done    HIV Screening  Never done    TETANUS VACCINE  Never done    Colorectal Cancer Screening  Never done     Shingles Vaccine (1 of 2) Never done    Influenza Vaccine (1) 09/01/2023     Health Maintenance Topics with due status: Not Due       Topic Last Completion Date    High Dose Statin 10/20/2023    Lipid Panel 10/24/2023    Hemoglobin A1c 10/24/2023    Eye Exam 12/15/2023    Mammogram 01/05/2024         Plan:    Phoebe was seen today for well woman.    Diagnoses and all orders for this visit:    Abnormal gynecological examination  No PAP   Speculum exam performed.   Reviewed calcium needs, exercise, and prevention of osteoporosis.  Healthy diet  Annual MMG  Discussed breast self-awareness  Colonoscopy q 10 yrs  Reviewed normal menopause and menopausal symptoms  RTC 1 yr     Atrophic vaginitis  Premarin vaginal cream qhs x14 then twice weekly thereafter.    - Educated     - Lubricants, coconut oil, baby oil      Encounter for screening mammogram for breast cancer  -     Cancel: Mammo Digital Screening Bilat w/ [facility]; Future  - Discussed recommendations of annual screening after age of 40 with mammogram and MRI for patients with lifetime risk greater than 25%     - Explained that screening is not 100% reliable.  Advised patient if she notices any changes to her breast including a lump, mass, dimpling, discharge, rash, or tenderness she should contact us immediately.     - Recommend monthly BSE      Hormone replacement therapy  The following orders have not been finalized:  -     estradioL (ESTRACE) 1 MG tablet  - Reviewed the physiologic roles of estrogen, progesterone and androgens in the female both positive and negative.     - Explained that with menopause comes a dramatic reduction in these hormones and that changes take place in their absence.     - Discussed symptoms of decreased hormone levels and that these symptoms usually last 6 months to 2 years.     - Reviewed hormone replacement options as well as indications and contraindications.     - Discussed the WHI study findings and current FDA recommendations. Also  discussed current recommendations for breast screening.     - Reviewed precautions when taking female hormones and symptoms to be aware of such as headache, visual change, focal weakness or numbness, SOB,chest pain, pain in thigh or calf, breast pain or lump, and vaginal bleeding. Instructed to call immediately and stop HRT if any of these symptoms occur.     -Advised patient of increased risk of stroke, heart attack, and blood clots.      Menopausal symptoms   - Reviewed self-help strategies (dietary changes/exercise/accupuncture/etc.), herbal and other OTC therapies, hormonal options as well as other medications used to treat common menopausal symptoms.    - Layered clothing, fans

## 2024-01-29 ENCOUNTER — OFFICE VISIT (OUTPATIENT)
Dept: OBSTETRICS AND GYNECOLOGY | Facility: CLINIC | Age: 54
End: 2024-01-29
Payer: COMMERCIAL

## 2024-01-29 VITALS
DIASTOLIC BLOOD PRESSURE: 70 MMHG | HEIGHT: 67 IN | TEMPERATURE: 97 F | SYSTOLIC BLOOD PRESSURE: 130 MMHG | BODY MASS INDEX: 24.8 KG/M2 | WEIGHT: 158 LBS

## 2024-01-29 DIAGNOSIS — N95.1 MENOPAUSAL SYMPTOM: ICD-10-CM

## 2024-01-29 DIAGNOSIS — N60.11 BILATERAL FIBROCYSTIC BREAST DISEASE (FCBD): ICD-10-CM

## 2024-01-29 DIAGNOSIS — Z12.31 ENCOUNTER FOR SCREENING MAMMOGRAM FOR BREAST CANCER: ICD-10-CM

## 2024-01-29 DIAGNOSIS — Z79.890 HORMONE REPLACEMENT THERAPY: ICD-10-CM

## 2024-01-29 DIAGNOSIS — N95.2 ATROPHIC VAGINITIS: ICD-10-CM

## 2024-01-29 DIAGNOSIS — Z01.411 ABNORMAL GYNECOLOGICAL EXAMINATION: Primary | ICD-10-CM

## 2024-01-29 DIAGNOSIS — N60.12 BILATERAL FIBROCYSTIC BREAST DISEASE (FCBD): ICD-10-CM

## 2024-01-29 PROCEDURE — 99396 PREV VISIT EST AGE 40-64: CPT | Mod: ,,, | Performed by: NURSE PRACTITIONER

## 2024-01-29 PROCEDURE — 3008F BODY MASS INDEX DOCD: CPT | Mod: CPTII,,, | Performed by: NURSE PRACTITIONER

## 2024-01-29 PROCEDURE — 3078F DIAST BP <80 MM HG: CPT | Mod: CPTII,,, | Performed by: NURSE PRACTITIONER

## 2024-01-29 PROCEDURE — 3075F SYST BP GE 130 - 139MM HG: CPT | Mod: CPTII,,, | Performed by: NURSE PRACTITIONER

## 2024-01-29 PROCEDURE — 1159F MED LIST DOCD IN RCRD: CPT | Mod: CPTII,,, | Performed by: NURSE PRACTITIONER

## 2024-01-29 PROCEDURE — 1160F RVW MEDS BY RX/DR IN RCRD: CPT | Mod: CPTII,,, | Performed by: NURSE PRACTITIONER

## 2024-01-29 RX ORDER — ESTRADIOL 1 MG/1
1 TABLET ORAL DAILY
Qty: 30 TABLET | Refills: 11 | Status: SHIPPED | OUTPATIENT
Start: 2024-01-29

## 2024-03-01 ENCOUNTER — TELEPHONE (OUTPATIENT)
Dept: FAMILY MEDICINE | Facility: CLINIC | Age: 54
End: 2024-03-01
Payer: COMMERCIAL

## 2024-03-01 DIAGNOSIS — E66.9 OBESITY WITHOUT SERIOUS COMORBIDITY, UNSPECIFIED CLASSIFICATION, UNSPECIFIED OBESITY TYPE: ICD-10-CM

## 2024-03-01 RX ORDER — PHENTERMINE HYDROCHLORIDE 37.5 MG/1
37.5 TABLET ORAL EVERY MORNING
Qty: 30 TABLET | Refills: 2 | Status: SHIPPED | OUTPATIENT
Start: 2024-03-01 | End: 2024-06-11 | Stop reason: SDUPTHER

## 2024-06-11 DIAGNOSIS — Z79.4 CONTROLLED TYPE 2 DIABETES MELLITUS WITHOUT COMPLICATION, WITH LONG-TERM CURRENT USE OF INSULIN: ICD-10-CM

## 2024-06-11 DIAGNOSIS — F41.1 GAD (GENERALIZED ANXIETY DISORDER): ICD-10-CM

## 2024-06-11 DIAGNOSIS — M77.9 INFLAMMATION AROUND JOINT: ICD-10-CM

## 2024-06-11 DIAGNOSIS — E66.9 OBESITY WITHOUT SERIOUS COMORBIDITY, UNSPECIFIED CLASSIFICATION, UNSPECIFIED OBESITY TYPE: ICD-10-CM

## 2024-06-11 DIAGNOSIS — E78.2 MIXED HYPERLIPIDEMIA: ICD-10-CM

## 2024-06-11 DIAGNOSIS — E11.9 CONTROLLED TYPE 2 DIABETES MELLITUS WITHOUT COMPLICATION, WITH LONG-TERM CURRENT USE OF INSULIN: ICD-10-CM

## 2024-06-11 RX ORDER — ROSUVASTATIN CALCIUM 5 MG/1
5 TABLET, COATED ORAL DAILY
Qty: 30 TABLET | Refills: 0 | Status: SHIPPED | OUTPATIENT
Start: 2024-06-11

## 2024-06-11 RX ORDER — BUPROPION HYDROCHLORIDE 150 MG/1
150 TABLET ORAL DAILY
Qty: 30 TABLET | Refills: 0 | Status: SHIPPED | OUTPATIENT
Start: 2024-06-11

## 2024-06-11 RX ORDER — METFORMIN HYDROCHLORIDE 500 MG/1
500 TABLET, EXTENDED RELEASE ORAL DAILY
Qty: 30 TABLET | Refills: 0 | Status: SHIPPED | OUTPATIENT
Start: 2024-06-11

## 2024-06-11 RX ORDER — MELOXICAM 7.5 MG/1
7.5 TABLET ORAL 2 TIMES DAILY
Qty: 60 TABLET | Refills: 0 | Status: SHIPPED | OUTPATIENT
Start: 2024-06-11

## 2024-06-11 NOTE — TELEPHONE ENCOUNTER
Pt has been scheduled labs and an appt to see Dr. Barr. She is wanting to know if her phentermine will be filled?

## 2024-06-12 RX ORDER — PHENTERMINE HYDROCHLORIDE 37.5 MG/1
37.5 TABLET ORAL EVERY MORNING
Qty: 30 TABLET | Refills: 2 | Status: SHIPPED | OUTPATIENT
Start: 2024-06-12

## 2024-06-28 ENCOUNTER — OFFICE VISIT (OUTPATIENT)
Dept: FAMILY MEDICINE | Facility: CLINIC | Age: 54
End: 2024-06-28
Payer: COMMERCIAL

## 2024-06-28 VITALS
HEIGHT: 67 IN | SYSTOLIC BLOOD PRESSURE: 138 MMHG | HEART RATE: 76 BPM | TEMPERATURE: 98 F | OXYGEN SATURATION: 99 % | DIASTOLIC BLOOD PRESSURE: 82 MMHG | BODY MASS INDEX: 25.43 KG/M2 | WEIGHT: 162 LBS

## 2024-06-28 DIAGNOSIS — I10 BENIGN ESSENTIAL HYPERTENSION: ICD-10-CM

## 2024-06-28 DIAGNOSIS — E78.00 HYPERCHOLESTEROLEMIA: ICD-10-CM

## 2024-06-28 DIAGNOSIS — E78.00 TYPE 2 DIABETES MELLITUS WITH HYPERCHOLESTEROLEMIA: Primary | ICD-10-CM

## 2024-06-28 DIAGNOSIS — Z12.11 SCREENING FOR COLON CANCER: ICD-10-CM

## 2024-06-28 DIAGNOSIS — E11.69 TYPE 2 DIABETES MELLITUS WITH HYPERCHOLESTEROLEMIA: Primary | ICD-10-CM

## 2024-06-28 NOTE — PROGRESS NOTES
"SUBJECTIVE:  HPI    Phoebe Arthur is a 53 y.o. female here for Follow-up (Follow up for labs).     She is without any complaints at this time.  She has been doing very well.  No medication-related side effects.    She reports that she had a colonoscopy greater than 10 years ago and it was normal.      Terrys allergies, medications, history, and problem list were updated as appropriate.    ROS:  Pertinent ROS as above, otherwise negative    OBJECTIVE:  Vital signs  Visit Vitals  /82 (BP Location: Left arm, Patient Position: Sitting)   Pulse 76   Temp 97.6 °F (36.4 °C) (Oral)   Ht 5' 7" (1.702 m)   Wt 73.5 kg (162 lb)   SpO2 99%   BMI 25.37 kg/m²          PHYSICAL EXAM:  General:  Awake, alert, no acute distress   Eyes:  Pupils equal, round, reactive to light.  Conjunctiva normal bilaterally.  Neck:  No lymphadenopathy, no bruit  Cardiovascular: Regular rate and rhythm.  No murmurs.  Respiratory: Clear to auscultation bilaterally, normal effort  Extremities:  No peripheral edema, no cyanosis  Skin: No rashes    Chemistry:  Lab Results   Component Value Date     06/20/2024    K 4.8 06/20/2024    BUN 17 06/20/2024    CREATININE 0.65 06/20/2024    EGFRNORACEVR 106 06/20/2024    GLUCOSE 129 (H) 03/24/2023    CALCIUM 9.6 06/20/2024    ALKPHOS 81 03/24/2023    AST 17 06/20/2024    ALT 13 06/20/2024    TSH 1.620 06/20/2024        Lab Results   Component Value Date    HGBA1C 6.1 (H) 06/20/2024        Hematology:  Lab Results   Component Value Date    WBC 11.4 (H) 06/20/2024    HGB 14.6 06/20/2024    MCV 98 (H) 06/20/2024     06/20/2024       Lipid Panel:  Lab Results   Component Value Date    CHOL 135 06/20/2024    HDL 78 06/20/2024    TRIG 60 06/20/2024    TOTALCHOLEST 1.8 01/26/2021        ASSESSMENT/PLAN:  1. Type 2 diabetes mellitus with hypercholesterolemia  Overview:  Lab Results   Component Value Date    HGBA1C 6.1 (H) 06/20/2024         Assessment & Plan:  On metformin  mg " "daily    Orders:  -     Hemoglobin A1C; Future; Expected date: 12/28/2024  -     Microalbumin/Creatinine Ratio, Urine; Future; Expected date: 12/28/2024    2. Benign essential hypertension  Assessment & Plan:  Controlled without meds at this time    Orders:  -     Comprehensive Metabolic Panel; Future; Expected date: 12/28/2024  -     CBC Auto Differential; Future; Expected date: 12/28/2024    3. Hypercholesterolemia  Overview:  Lab Results   Component Value Date    CHOL 135 06/20/2024    CHOL 120 10/23/2023    CHOL 137 03/24/2023     Lab Results   Component Value Date    HDL 78 06/20/2024    HDL 76 10/23/2023    HDL 91 (H) 03/24/2023     Lab Results   Component Value Date    LDLCALC 44 06/20/2024    LDLCALC 30 10/23/2023     No results found for: "DLDL"    Lab Results   Component Value Date    TRIG 60 06/20/2024    TRIG 70 10/23/2023    TRIG 64 03/24/2023       f1 No results found for: "CHOLHDL"      Assessment & Plan:  LDL cholesterol goal of less than 70     Rosuvastatin 5 mg daily    Orders:  -     Lipid Panel; Future; Expected date: 12/28/2024    4. Screening for colon cancer  -     Cologuard Screening (Multitarget Stool DNA); Future; Expected date: 06/28/2024            Follow Up:  Follow up in about 6 months (around 12/28/2024) for chronic health conditions, Fasting labs.          "

## 2024-07-12 DIAGNOSIS — F41.1 GAD (GENERALIZED ANXIETY DISORDER): ICD-10-CM

## 2024-07-12 DIAGNOSIS — E78.2 MIXED HYPERLIPIDEMIA: ICD-10-CM

## 2024-07-12 RX ORDER — BUPROPION HYDROCHLORIDE 150 MG/1
150 TABLET ORAL
Qty: 30 TABLET | Refills: 0 | Status: SHIPPED | OUTPATIENT
Start: 2024-07-12

## 2024-07-12 RX ORDER — ROSUVASTATIN CALCIUM 5 MG/1
5 TABLET, COATED ORAL
Qty: 30 TABLET | Refills: 0 | Status: SHIPPED | OUTPATIENT
Start: 2024-07-12

## 2024-07-17 ENCOUNTER — PATIENT MESSAGE (OUTPATIENT)
Facility: CLINIC | Age: 54
End: 2024-07-17
Payer: COMMERCIAL

## 2024-07-18 ENCOUNTER — PATIENT OUTREACH (OUTPATIENT)
Facility: CLINIC | Age: 54
End: 2024-07-18
Payer: COMMERCIAL

## 2024-07-18 NOTE — PROGRESS NOTES
Health Maintenance Topic(s) Outreach Outcomes & Actions Taken:    Colorectal Cancer Screening - Outreach Outcomes & Actions Taken  : Cologuard ordered by Primary Dr on 6/28/2024    Lab(s) - Outreach Outcomes & Actions Taken  : Overdue Lab(s) Ordered and Lab ordered by Primary Dr.    Primary Care Appt - Outreach Outcomes & Actions Taken  : Primary Care Appt Scheduled and Patient has 6 month f/u on 1/24/2025                                 Additional Notes:           Care Management, Digital Medicine, and/or Education Referrals      Next Steps - Referral Actions: no referrals sent

## 2024-08-09 DIAGNOSIS — Z79.890 HORMONE REPLACEMENT THERAPY: ICD-10-CM

## 2024-08-09 DIAGNOSIS — F41.1 GAD (GENERALIZED ANXIETY DISORDER): ICD-10-CM

## 2024-08-09 DIAGNOSIS — M77.9 INFLAMMATION AROUND JOINT: ICD-10-CM

## 2024-08-09 DIAGNOSIS — E66.9 OBESITY WITHOUT SERIOUS COMORBIDITY, UNSPECIFIED CLASSIFICATION, UNSPECIFIED OBESITY TYPE: ICD-10-CM

## 2024-08-09 DIAGNOSIS — E78.2 MIXED HYPERLIPIDEMIA: ICD-10-CM

## 2024-08-09 DIAGNOSIS — E11.9 CONTROLLED TYPE 2 DIABETES MELLITUS WITHOUT COMPLICATION, WITH LONG-TERM CURRENT USE OF INSULIN: ICD-10-CM

## 2024-08-09 DIAGNOSIS — Z79.4 CONTROLLED TYPE 2 DIABETES MELLITUS WITHOUT COMPLICATION, WITH LONG-TERM CURRENT USE OF INSULIN: ICD-10-CM

## 2024-08-12 RX ORDER — METFORMIN HYDROCHLORIDE 500 MG/1
500 TABLET, EXTENDED RELEASE ORAL
Qty: 30 TABLET | Refills: 0 | Status: SHIPPED | OUTPATIENT
Start: 2024-08-12

## 2024-08-12 RX ORDER — MELOXICAM 7.5 MG/1
7.5 TABLET ORAL 2 TIMES DAILY
Qty: 60 TABLET | Refills: 0 | Status: SHIPPED | OUTPATIENT
Start: 2024-08-12

## 2024-08-12 RX ORDER — MELOXICAM 7.5 MG/1
7.5 TABLET ORAL 2 TIMES DAILY
Qty: 180 TABLET | Refills: 3 | Status: SHIPPED | OUTPATIENT
Start: 2024-08-12 | End: 2025-08-12

## 2024-08-12 RX ORDER — ROSUVASTATIN CALCIUM 5 MG/1
5 TABLET, COATED ORAL
Qty: 30 TABLET | Refills: 0 | Status: SHIPPED | OUTPATIENT
Start: 2024-08-12

## 2024-08-12 RX ORDER — BUPROPION HYDROCHLORIDE 150 MG/1
150 TABLET ORAL
Qty: 30 TABLET | Refills: 0 | Status: SHIPPED | OUTPATIENT
Start: 2024-08-12

## 2024-08-12 RX ORDER — PHENTERMINE HYDROCHLORIDE 37.5 MG/1
37.5 TABLET ORAL EVERY MORNING
Qty: 30 TABLET | Refills: 2 | Status: SHIPPED | OUTPATIENT
Start: 2024-08-12

## 2024-08-12 RX ORDER — BUPROPION HYDROCHLORIDE 150 MG/1
150 TABLET ORAL DAILY
Qty: 90 TABLET | Refills: 3 | Status: SHIPPED | OUTPATIENT
Start: 2024-08-12 | End: 2025-08-12

## 2024-08-12 RX ORDER — METFORMIN HYDROCHLORIDE 500 MG/1
500 TABLET, EXTENDED RELEASE ORAL DAILY
Qty: 90 TABLET | Refills: 3 | Status: SHIPPED | OUTPATIENT
Start: 2024-08-12 | End: 2025-08-12

## 2024-08-12 RX ORDER — ROSUVASTATIN CALCIUM 5 MG/1
5 TABLET, COATED ORAL DAILY
Qty: 90 TABLET | Refills: 3 | Status: SHIPPED | OUTPATIENT
Start: 2024-08-12 | End: 2025-08-12

## 2024-08-12 RX ORDER — ESTRADIOL 1 MG/1
1 TABLET ORAL DAILY
Qty: 30 TABLET | Refills: 6 | Status: SHIPPED | OUTPATIENT
Start: 2024-08-12

## 2024-09-27 ENCOUNTER — OFFICE VISIT (OUTPATIENT)
Dept: FAMILY MEDICINE | Facility: CLINIC | Age: 54
End: 2024-09-27
Payer: COMMERCIAL

## 2024-09-27 VITALS
DIASTOLIC BLOOD PRESSURE: 82 MMHG | WEIGHT: 160 LBS | BODY MASS INDEX: 25.11 KG/M2 | HEART RATE: 72 BPM | HEIGHT: 67 IN | SYSTOLIC BLOOD PRESSURE: 118 MMHG | OXYGEN SATURATION: 99 % | TEMPERATURE: 98 F

## 2024-09-27 DIAGNOSIS — M65.342 TRIGGER FINGER, LEFT RING FINGER: Primary | ICD-10-CM

## 2024-09-27 NOTE — PROGRESS NOTES
"SUBJECTIVE:  HPI    Phoebe Arthur is a 53 y.o. female here for Trigger finger, left ring finger and Injections.     Recurrent triggering of left ring finger.  Last injected October 2023.  Patient requests another injection.      Terrys allergies, medications, history, and problem list were updated as appropriate.    ROS:  Pertinent ROS as above, otherwise negative    OBJECTIVE:  Vital signs  Visit Vitals  /82   Pulse 72   Temp 97.7 °F (36.5 °C) (Oral)   Ht 5' 7" (1.702 m)   Wt 72.6 kg (160 lb)   SpO2 99%   BMI 25.06 kg/m²          PHYSICAL EXAM:  Left ring finger triggering with palmar nodule just proximal to the MCP    Procedure:  Ring finger prepped in sterile fashion.  Injected left ring finger flexor tendon sheath with 25 gauge needle with lidocaine 1% without epinephrine and 40 mg of Depo-Medrol      ASSESSMENT/PLAN:  1. Trigger finger, left ring finger  Assessment & Plan:  Injected today    Aftercare instructions discussed          "

## 2024-09-30 ENCOUNTER — TELEPHONE (OUTPATIENT)
Dept: FAMILY MEDICINE | Facility: CLINIC | Age: 54
End: 2024-09-30
Payer: COMMERCIAL

## 2024-10-11 DIAGNOSIS — Z79.890 HORMONE REPLACEMENT THERAPY: ICD-10-CM

## 2024-10-11 DIAGNOSIS — M77.9 INFLAMMATION AROUND JOINT: ICD-10-CM

## 2024-10-11 DIAGNOSIS — F41.1 GAD (GENERALIZED ANXIETY DISORDER): ICD-10-CM

## 2024-10-11 DIAGNOSIS — E11.9 CONTROLLED TYPE 2 DIABETES MELLITUS WITHOUT COMPLICATION, WITH LONG-TERM CURRENT USE OF INSULIN: ICD-10-CM

## 2024-10-11 DIAGNOSIS — N95.2 ATROPHIC VAGINITIS: Primary | ICD-10-CM

## 2024-10-11 DIAGNOSIS — Z79.4 CONTROLLED TYPE 2 DIABETES MELLITUS WITHOUT COMPLICATION, WITH LONG-TERM CURRENT USE OF INSULIN: ICD-10-CM

## 2024-10-11 DIAGNOSIS — E66.9 OBESITY WITHOUT SERIOUS COMORBIDITY, UNSPECIFIED CLASSIFICATION, UNSPECIFIED OBESITY TYPE: ICD-10-CM

## 2024-10-11 DIAGNOSIS — E78.2 MIXED HYPERLIPIDEMIA: ICD-10-CM

## 2024-10-11 RX ORDER — MELOXICAM 7.5 MG/1
7.5 TABLET ORAL 2 TIMES DAILY
Qty: 180 TABLET | Refills: 3 | Status: SHIPPED | OUTPATIENT
Start: 2024-10-11 | End: 2025-10-11

## 2024-10-11 RX ORDER — ROSUVASTATIN CALCIUM 5 MG/1
5 TABLET, COATED ORAL DAILY
Qty: 90 TABLET | Refills: 3 | Status: SHIPPED | OUTPATIENT
Start: 2024-10-11 | End: 2025-10-11

## 2024-10-11 RX ORDER — METFORMIN HYDROCHLORIDE 500 MG/1
500 TABLET, EXTENDED RELEASE ORAL DAILY
Qty: 90 TABLET | Refills: 3 | Status: SHIPPED | OUTPATIENT
Start: 2024-10-11 | End: 2025-10-11

## 2024-10-11 RX ORDER — PHENTERMINE HYDROCHLORIDE 37.5 MG/1
37.5 TABLET ORAL EVERY MORNING
Qty: 30 TABLET | Refills: 2 | Status: SHIPPED | OUTPATIENT
Start: 2024-10-11

## 2024-10-11 RX ORDER — ESTRADIOL 1 MG/1
1 TABLET ORAL DAILY
Qty: 30 TABLET | Refills: 4 | Status: SHIPPED | OUTPATIENT
Start: 2024-10-11

## 2024-10-11 RX ORDER — BUPROPION HYDROCHLORIDE 150 MG/1
150 TABLET ORAL DAILY
Qty: 90 TABLET | Refills: 3 | Status: SHIPPED | OUTPATIENT
Start: 2024-10-11 | End: 2025-10-11

## 2024-11-07 DIAGNOSIS — E66.9 OBESITY WITHOUT SERIOUS COMORBIDITY, UNSPECIFIED CLASSIFICATION, UNSPECIFIED OBESITY TYPE: ICD-10-CM

## 2024-11-07 DIAGNOSIS — Z79.4 CONTROLLED TYPE 2 DIABETES MELLITUS WITHOUT COMPLICATION, WITH LONG-TERM CURRENT USE OF INSULIN: ICD-10-CM

## 2024-11-07 DIAGNOSIS — F41.1 GAD (GENERALIZED ANXIETY DISORDER): ICD-10-CM

## 2024-11-07 DIAGNOSIS — M77.9 INFLAMMATION AROUND JOINT: ICD-10-CM

## 2024-11-07 DIAGNOSIS — E11.9 CONTROLLED TYPE 2 DIABETES MELLITUS WITHOUT COMPLICATION, WITH LONG-TERM CURRENT USE OF INSULIN: ICD-10-CM

## 2024-11-07 DIAGNOSIS — E78.2 MIXED HYPERLIPIDEMIA: ICD-10-CM

## 2024-11-07 RX ORDER — ROSUVASTATIN CALCIUM 5 MG/1
5 TABLET, COATED ORAL DAILY
Qty: 90 TABLET | Refills: 3 | Status: SHIPPED | OUTPATIENT
Start: 2024-11-07 | End: 2025-11-07

## 2024-11-07 RX ORDER — PHENTERMINE HYDROCHLORIDE 37.5 MG/1
37.5 TABLET ORAL EVERY MORNING
Qty: 30 TABLET | Refills: 2 | Status: SHIPPED | OUTPATIENT
Start: 2024-11-07

## 2024-11-07 RX ORDER — BUPROPION HYDROCHLORIDE 150 MG/1
150 TABLET ORAL DAILY
Qty: 90 TABLET | Refills: 3 | Status: SHIPPED | OUTPATIENT
Start: 2024-11-07 | End: 2025-11-07

## 2024-11-07 RX ORDER — METFORMIN HYDROCHLORIDE 500 MG/1
500 TABLET, EXTENDED RELEASE ORAL DAILY
Qty: 90 TABLET | Refills: 3 | Status: SHIPPED | OUTPATIENT
Start: 2024-11-07 | End: 2025-11-07

## 2024-11-07 RX ORDER — MELOXICAM 7.5 MG/1
7.5 TABLET ORAL 2 TIMES DAILY
Qty: 180 TABLET | Refills: 3 | Status: SHIPPED | OUTPATIENT
Start: 2024-11-07 | End: 2025-11-07

## 2025-01-10 DIAGNOSIS — F41.1 GAD (GENERALIZED ANXIETY DISORDER): ICD-10-CM

## 2025-01-10 DIAGNOSIS — E78.2 MIXED HYPERLIPIDEMIA: ICD-10-CM

## 2025-01-10 DIAGNOSIS — Z79.890 HORMONE REPLACEMENT THERAPY: ICD-10-CM

## 2025-01-10 DIAGNOSIS — Z79.4 CONTROLLED TYPE 2 DIABETES MELLITUS WITHOUT COMPLICATION, WITH LONG-TERM CURRENT USE OF INSULIN: ICD-10-CM

## 2025-01-10 DIAGNOSIS — E11.9 CONTROLLED TYPE 2 DIABETES MELLITUS WITHOUT COMPLICATION, WITH LONG-TERM CURRENT USE OF INSULIN: ICD-10-CM

## 2025-01-10 DIAGNOSIS — M77.9 INFLAMMATION AROUND JOINT: ICD-10-CM

## 2025-01-10 DIAGNOSIS — E66.9 OBESITY WITHOUT SERIOUS COMORBIDITY, UNSPECIFIED CLASSIFICATION, UNSPECIFIED OBESITY TYPE: ICD-10-CM

## 2025-01-10 RX ORDER — ROSUVASTATIN CALCIUM 5 MG/1
5 TABLET, COATED ORAL DAILY
Qty: 90 TABLET | Refills: 3 | Status: SHIPPED | OUTPATIENT
Start: 2025-01-10 | End: 2026-01-10

## 2025-01-10 RX ORDER — PHENTERMINE HYDROCHLORIDE 37.5 MG/1
37.5 TABLET ORAL EVERY MORNING
Qty: 30 TABLET | Refills: 2 | Status: SHIPPED | OUTPATIENT
Start: 2025-01-10

## 2025-01-10 RX ORDER — ESTRADIOL 1 MG/1
1 TABLET ORAL DAILY
Qty: 30 TABLET | Refills: 0 | Status: SHIPPED | OUTPATIENT
Start: 2025-01-10

## 2025-01-10 RX ORDER — MELOXICAM 7.5 MG/1
7.5 TABLET ORAL 2 TIMES DAILY
Qty: 180 TABLET | Refills: 3 | Status: SHIPPED | OUTPATIENT
Start: 2025-01-10 | End: 2026-01-10

## 2025-01-10 RX ORDER — METFORMIN HYDROCHLORIDE 500 MG/1
500 TABLET, EXTENDED RELEASE ORAL DAILY
Qty: 90 TABLET | Refills: 3 | Status: SHIPPED | OUTPATIENT
Start: 2025-01-10 | End: 2026-01-10

## 2025-01-10 RX ORDER — BUPROPION HYDROCHLORIDE 150 MG/1
150 TABLET ORAL DAILY
Qty: 90 TABLET | Refills: 3 | Status: SHIPPED | OUTPATIENT
Start: 2025-01-10 | End: 2026-01-10

## 2025-01-24 ENCOUNTER — OFFICE VISIT (OUTPATIENT)
Dept: FAMILY MEDICINE | Facility: CLINIC | Age: 55
End: 2025-01-24
Payer: COMMERCIAL

## 2025-01-24 VITALS
HEART RATE: 73 BPM | WEIGHT: 166 LBS | TEMPERATURE: 98 F | DIASTOLIC BLOOD PRESSURE: 84 MMHG | OXYGEN SATURATION: 100 % | SYSTOLIC BLOOD PRESSURE: 138 MMHG | HEIGHT: 67 IN | BODY MASS INDEX: 26.06 KG/M2

## 2025-01-24 DIAGNOSIS — E11.69 TYPE 2 DIABETES MELLITUS WITH HYPERCHOLESTEROLEMIA: Primary | ICD-10-CM

## 2025-01-24 DIAGNOSIS — Z79.890 HORMONE REPLACEMENT THERAPY: ICD-10-CM

## 2025-01-24 DIAGNOSIS — Z12.31 SCREENING MAMMOGRAM FOR BREAST CANCER: ICD-10-CM

## 2025-01-24 DIAGNOSIS — F41.1 GAD (GENERALIZED ANXIETY DISORDER): ICD-10-CM

## 2025-01-24 DIAGNOSIS — M65.342 TRIGGER FINGER, LEFT RING FINGER: ICD-10-CM

## 2025-01-24 DIAGNOSIS — I10 BENIGN ESSENTIAL HYPERTENSION: ICD-10-CM

## 2025-01-24 DIAGNOSIS — E78.00 TYPE 2 DIABETES MELLITUS WITH HYPERCHOLESTEROLEMIA: Primary | ICD-10-CM

## 2025-01-24 DIAGNOSIS — E78.2 MIXED HYPERLIPIDEMIA: ICD-10-CM

## 2025-01-24 DIAGNOSIS — E66.09 OBESITY DUE TO EXCESS CALORIES WITHOUT SERIOUS COMORBIDITY, UNSPECIFIED CLASS: ICD-10-CM

## 2025-01-24 PROCEDURE — 3044F HG A1C LEVEL LT 7.0%: CPT | Mod: CPTII,,, | Performed by: FAMILY MEDICINE

## 2025-01-24 PROCEDURE — 3079F DIAST BP 80-89 MM HG: CPT | Mod: CPTII,,, | Performed by: FAMILY MEDICINE

## 2025-01-24 PROCEDURE — 3075F SYST BP GE 130 - 139MM HG: CPT | Mod: CPTII,,, | Performed by: FAMILY MEDICINE

## 2025-01-24 PROCEDURE — 99214 OFFICE O/P EST MOD 30 MIN: CPT | Mod: ,,, | Performed by: FAMILY MEDICINE

## 2025-01-24 PROCEDURE — 1159F MED LIST DOCD IN RCRD: CPT | Mod: CPTII,,, | Performed by: FAMILY MEDICINE

## 2025-01-24 PROCEDURE — 3061F NEG MICROALBUMINURIA REV: CPT | Mod: CPTII,,, | Performed by: FAMILY MEDICINE

## 2025-01-24 PROCEDURE — 3008F BODY MASS INDEX DOCD: CPT | Mod: CPTII,,, | Performed by: FAMILY MEDICINE

## 2025-01-24 PROCEDURE — 3066F NEPHROPATHY DOC TX: CPT | Mod: CPTII,,, | Performed by: FAMILY MEDICINE

## 2025-01-24 RX ORDER — PHENTERMINE HYDROCHLORIDE 37.5 MG/1
37.5 TABLET ORAL EVERY MORNING
Qty: 30 TABLET | Refills: 2 | Status: SHIPPED | OUTPATIENT
Start: 2025-01-24 | End: 2025-04-24

## 2025-01-24 RX ORDER — MELOXICAM 7.5 MG/1
7.5 TABLET ORAL 2 TIMES DAILY
Qty: 180 TABLET | Refills: 3 | Status: SHIPPED | OUTPATIENT
Start: 2025-01-24 | End: 2026-01-24

## 2025-01-24 RX ORDER — ROSUVASTATIN CALCIUM 5 MG/1
5 TABLET, COATED ORAL DAILY
Qty: 90 TABLET | Refills: 3 | Status: SHIPPED | OUTPATIENT
Start: 2025-01-24 | End: 2026-01-24

## 2025-01-24 RX ORDER — PHENTERMINE HYDROCHLORIDE 37.5 MG/1
37.5 TABLET ORAL EVERY MORNING
Qty: 30 TABLET | Refills: 2 | Status: SHIPPED | OUTPATIENT
Start: 2025-01-24 | End: 2025-01-24

## 2025-01-24 RX ORDER — METFORMIN HYDROCHLORIDE 500 MG/1
500 TABLET, EXTENDED RELEASE ORAL DAILY
Qty: 90 TABLET | Refills: 3 | Status: SHIPPED | OUTPATIENT
Start: 2025-01-24 | End: 2026-01-24

## 2025-01-24 RX ORDER — BUPROPION HYDROCHLORIDE 150 MG/1
150 TABLET ORAL DAILY
Qty: 90 TABLET | Refills: 3 | Status: SHIPPED | OUTPATIENT
Start: 2025-01-24 | End: 2026-01-24

## 2025-01-24 RX ORDER — ESTRADIOL 1 MG/1
1 TABLET ORAL DAILY
Qty: 90 TABLET | Refills: 3 | Status: SHIPPED | OUTPATIENT
Start: 2025-01-24 | End: 2026-01-24

## 2025-01-24 NOTE — PROGRESS NOTES
"SUBJECTIVE:  HPI    Phoebe Arthur is a 54 y.o. female here for Follow-up (Lab results).  See assessment and plan for problems addressed during the visit.      She denies any current problems, questions, concerns.  She denies any chest pain, shortness on breath, abdominal pain.    She does need medication refills today.      We reviewed and discussed her lab results.      Phoebe's allergies, medications, history, and problem list were updated as appropriate.    ROS:  Pertinent ROS as above, otherwise negative    OBJECTIVE:  Vital signs  Visit Vitals  /84 (BP Location: Right arm, Patient Position: Sitting)   Pulse 73   Temp 98 °F (36.7 °C) (Temporal)   Ht 5' 7.01" (1.702 m)   Wt 75.3 kg (166 lb)   SpO2 100%   BMI 25.99 kg/m²          PHYSICAL EXAM:  General:  Awake, alert, no acute distress   Eyes:  Pupils equal, round, reactive to light.  Conjunctiva normal bilaterally.  Neck:  No lymphadenopathy, no bruit  Cardiovascular: Regular rate and rhythm.  No murmurs.  Respiratory: Clear to auscultation bilaterally, normal effort  Extremities:  No peripheral edema, no cyanosis  Skin: No rashes    Chemistry:  Lab Results   Component Value Date     01/17/2025    K 5.0 01/17/2025    BUN 19 01/17/2025    CREATININE 0.61 01/17/2025    EGFRNORACEVR 107 01/17/2025    GLUCOSE 129 (H) 03/24/2023    CALCIUM 9.7 01/17/2025    ALKPHOS 81 03/24/2023    AST 17 01/17/2025    ALT 16 01/17/2025    TSH 1.620 06/20/2024        Lab Results   Component Value Date    HGBA1C 5.8 (H) 01/17/2025        Hematology:  Lab Results   Component Value Date    WBC 9.8 01/17/2025    HGB 14.8 01/17/2025    MCV 96 01/17/2025     01/17/2025       Lipid Panel:  Lab Results   Component Value Date    CHOL 140 01/17/2025    HDL 73 01/17/2025    LDLDIRECT 32.3 03/24/2023    TRIG 45 01/17/2025    TOTALCHOLEST 1.8 01/26/2021        ASSESSMENT/PLAN:  1. Type 2 diabetes mellitus with hypercholesterolemia  Overview:  Lab Results   Component Value " "Date    HGBA1C 5.8 (H) 01/17/2025         Assessment & Plan:  On metformin  mg daily    Orders:  -     Hemoglobin A1C; Future; Expected date: 07/24/2025  -     Microalbumin/Creatinine Ratio, Urine; Future; Expected date: 07/24/2025  -     metFORMIN (GLUCOPHAGE-XR) 500 MG ER 24hr tablet; Take 1 tablet (500 mg total) by mouth once daily.  Dispense: 90 tablet; Refill: 3    2. Benign essential hypertension  Assessment & Plan:  Controlled without meds at this time    Orders:  -     CBC Auto Differential; Future; Expected date: 07/24/2025  -     Comprehensive Metabolic Panel; Future; Expected date: 07/24/2025    3. Mixed hyperlipidemia  Overview:  Lab Results   Component Value Date    CHOL 140 01/17/2025    CHOL 135 06/20/2024    CHOL 120 10/23/2023     Lab Results   Component Value Date    HDL 73 01/17/2025    HDL 78 06/20/2024    HDL 76 10/23/2023     Lab Results   Component Value Date    LDLCALC 57 01/17/2025    LDLCALC 44 06/20/2024    LDLCALC 30 10/23/2023     No results found for: "DLDL"    Lab Results   Component Value Date    TRIG 45 01/17/2025    TRIG 60 06/20/2024    TRIG 70 10/23/2023       f1 No results found for: "CHOLHDL"      Assessment & Plan:  LDL cholesterol goal of less than 70     Rosuvastatin 5 mg daily    Orders:  -     Lipid Panel; Future; Expected date: 07/24/2025  -     rosuvastatin (CRESTOR) 5 MG tablet; Take 1 tablet (5 mg total) by mouth once daily.  Dispense: 90 tablet; Refill: 3    4. Screening mammogram for breast cancer  -     Mammo Digital Screening Bilat w/ Scott; Future; Expected date: 01/24/2025    5. MONISHA (generalized anxiety disorder)  -     buPROPion (WELLBUTRIN XL) 150 MG TB24 tablet; Take 1 tablet (150 mg total) by mouth once daily.  Dispense: 90 tablet; Refill: 3    6. Hormone replacement therapy  -     estradioL (ESTRACE) 1 MG tablet; Take 1 tablet (1 mg total) by mouth once daily.  Dispense: 90 tablet; Refill: 3    7. Trigger finger, left ring finger  -     meloxicam (MOBIC) " 7.5 MG tablet; Take 1 tablet (7.5 mg total) by mouth 2 (two) times daily.  Dispense: 180 tablet; Refill: 3    8. Obesity due to excess calories without serious comorbidity, unspecified class  -     Discontinue: phentermine (ADIPEX-P) 37.5 mg tablet; Take 1 tablet (37.5 mg total) by mouth every morning.  Dispense: 30 tablet; Refill: 2  -     phentermine (ADIPEX-P) 37.5 mg tablet; Take 1 tablet (37.5 mg total) by mouth every morning.  Dispense: 30 tablet; Refill: 2        Follow Up:  Follow up in about 6 months (around 7/24/2025) for chronic health conditions, Fasting labs.

## 2025-01-31 ENCOUNTER — HOSPITAL ENCOUNTER (OUTPATIENT)
Dept: RADIOLOGY | Facility: HOSPITAL | Age: 55
Discharge: HOME | End: 2025-01-31
Attending: FAMILY MEDICINE
Payer: COMMERCIAL

## 2025-01-31 DIAGNOSIS — Z12.31 SCREENING MAMMOGRAM FOR BREAST CANCER: ICD-10-CM

## 2025-01-31 PROCEDURE — 77063 BREAST TOMOSYNTHESIS BI: CPT | Mod: TC

## 2025-02-03 ENCOUNTER — TELEPHONE (OUTPATIENT)
Dept: FAMILY MEDICINE | Facility: CLINIC | Age: 55
End: 2025-02-03
Payer: COMMERCIAL

## 2025-02-03 NOTE — TELEPHONE ENCOUNTER
----- Message from Hernan Barr MD sent at 2/3/2025 11:09 AM CST -----  Mammogram okay.  Repeat 1 year.

## 2025-02-04 ENCOUNTER — OFFICE VISIT (OUTPATIENT)
Dept: OBSTETRICS AND GYNECOLOGY | Facility: CLINIC | Age: 55
End: 2025-02-04
Payer: COMMERCIAL

## 2025-02-04 VITALS
TEMPERATURE: 96 F | SYSTOLIC BLOOD PRESSURE: 130 MMHG | WEIGHT: 161.63 LBS | HEIGHT: 67 IN | DIASTOLIC BLOOD PRESSURE: 82 MMHG | BODY MASS INDEX: 25.37 KG/M2

## 2025-02-04 DIAGNOSIS — Z90.710 HISTORY OF TOTAL ABDOMINAL HYSTERECTOMY: ICD-10-CM

## 2025-02-04 DIAGNOSIS — N95.2 ATROPHIC VAGINITIS: ICD-10-CM

## 2025-02-04 DIAGNOSIS — Z79.890 HORMONE REPLACEMENT THERAPY: ICD-10-CM

## 2025-02-04 DIAGNOSIS — Z01.411 ABNORMAL GYNECOLOGICAL EXAMINATION: Primary | ICD-10-CM

## 2025-02-04 PROCEDURE — 3044F HG A1C LEVEL LT 7.0%: CPT | Mod: CPTII,,, | Performed by: NURSE PRACTITIONER

## 2025-02-04 PROCEDURE — 99396 PREV VISIT EST AGE 40-64: CPT | Mod: ,,, | Performed by: NURSE PRACTITIONER

## 2025-02-04 PROCEDURE — 3079F DIAST BP 80-89 MM HG: CPT | Mod: CPTII,,, | Performed by: NURSE PRACTITIONER

## 2025-02-04 PROCEDURE — 3008F BODY MASS INDEX DOCD: CPT | Mod: CPTII,,, | Performed by: NURSE PRACTITIONER

## 2025-02-04 PROCEDURE — 3066F NEPHROPATHY DOC TX: CPT | Mod: CPTII,,, | Performed by: NURSE PRACTITIONER

## 2025-02-04 PROCEDURE — 3061F NEG MICROALBUMINURIA REV: CPT | Mod: CPTII,,, | Performed by: NURSE PRACTITIONER

## 2025-02-04 PROCEDURE — 1160F RVW MEDS BY RX/DR IN RCRD: CPT | Mod: CPTII,,, | Performed by: NURSE PRACTITIONER

## 2025-02-04 PROCEDURE — 3075F SYST BP GE 130 - 139MM HG: CPT | Mod: CPTII,,, | Performed by: NURSE PRACTITIONER

## 2025-02-04 PROCEDURE — 1159F MED LIST DOCD IN RCRD: CPT | Mod: CPTII,,, | Performed by: NURSE PRACTITIONER

## 2025-02-04 RX ORDER — ESTRADIOL 1 MG/1
1 TABLET ORAL DAILY
Qty: 90 TABLET | Refills: 3 | Status: SHIPPED | OUTPATIENT
Start: 2025-02-04 | End: 2026-02-04

## 2025-02-04 NOTE — PROGRESS NOTES
Chief Complaint:   Well Woman     History of Present Illness:  Phoebe Arthur is a 54 y.o. year old  presents for her well woman exam s/p VINNIE. Currently taking premarin vaginal cream and Estradiol 1mg for HRT. Pt reports doing well, desires to continue. No vaginal bleeding following hysterectomy. Denies any health changes.     Cancer-related family history includes Breast cancer (age of onset: 50) in her maternal aunt; Cancer in her father. There is no history of Ovarian cancer, Uterine cancer, or Cervical cancer.      Gyn History:  Menstrual History  Cycle: No  Menarche Age: 13 years  No Cycle Reason: (!) Surgical  Surgical Reason: hysterectomy    Menopause  Menopause Age: 1 years  Post Menopausal Bleeding: No  Hormone Replacement Therapy: Yes (Estradiol/Premarin vaginal cream)    Pap History  Last pap date: 10/22/21  Result: Normal  History of Abnormal Pap: No  HPV Vaccine Completed: No (0/3)    Caban  Sexually Active: Yes  Sexual Orientation: heterosexual  Postcoital Bleeding: No  Dyspareunia: No  STI History: No  Contraception: No    Breast History  Last Breast Imaging Date: Yes  Date: 25 (Benign)  History of Abnormal Breast Imaging : No  History of Breast Biopsy: No        Review of Systems:  General/Constitutional: Chills denies. Fatigue/weakness denies. Fever denies. Night sweats denies. Hot flashes denies    Respiratory: Cough denies. Hemoptysis denies. SOB denies. Sputum production denies. Wheezing denies .   Cardiovascular: Chest pain denies. Dizziness denies. Palpitations denies. Swelling in hands/feet denies.                Breast: Dimpling denies. Breast mass denies. Breast pain/tenderness denies. Nipple discharge denies. Puckering denies.  Gastrointestinal: Abdominal pain denies. Blood in stool denies. Constipation denies. Diarrhea denies. Heartburn denies. Nausea denies. Vomiting denies    Genitourinary: Incontinence denies. Blood in urine denies. Frequent urination denies.  Painful urination denies. Urinary urgency denies. Nocturia denies    Gynecologic: Irregular menses denies. Heavy bleeding denies. Painful menses denies. Vaginal discharge denies. Vaginal odor denies. Vaginal itching denies. Vaginal lesion denies. Pelvic pain denies. Decreased libido denies. Vulvar lesion denies. Prolapse of genital organs denies. Painful intercourse denies. Postcoital bleeding denies    Psychiatric: Depression denies. Anxiety denies.     OB History    Para Term  AB Living   4 4 4 0 0 4   SAB IAB Ectopic Multiple Live Births   0 0 0 0 4      # 1 - Date: , Sex: Female, Weight: 3.221 kg (7 lb 1.6 oz), GA: None, Type: Vaginal, Spontaneous, Apgar1: None, Apgar5: None, Living: Living, Birth Comments: None    # 2 - Date: , Sex: Male, Weight: 3.239 kg (7 lb 2.2 oz), GA: None, Type: Vaginal, Spontaneous, Apgar1: None, Apgar5: None, Living: Living, Birth Comments: None    # 3 - Date: , Sex: Male, Weight: 4.037 kg (8 lb 14.4 oz), GA: None, Type: Vaginal, Spontaneous, Apgar1: None, Apgar5: None, Living: Living, Birth Comments: None    # 4 - Date: , Sex: Female, Weight: 3.23 kg (7 lb 1.9 oz), GA: None, Type: Vaginal, Spontaneous, Apgar1: None, Apgar5: None, Living: Living, Birth Comments: None      Past Medical History:   Diagnosis Date    Diabetes mellitus, type 2        Current Outpatient Medications:     buPROPion (WELLBUTRIN XL) 150 MG TB24 tablet, Take 1 tablet (150 mg total) by mouth once daily., Disp: 90 tablet, Rfl: 3    meloxicam (MOBIC) 7.5 MG tablet, Take 1 tablet (7.5 mg total) by mouth 2 (two) times daily., Disp: 180 tablet, Rfl: 3    metFORMIN (GLUCOPHAGE-XR) 500 MG ER 24hr tablet, Take 1 tablet (500 mg total) by mouth once daily., Disp: 90 tablet, Rfl: 3    phentermine (ADIPEX-P) 37.5 mg tablet, Take 1 tablet (37.5 mg total) by mouth every morning., Disp: 30 tablet, Rfl: 2    rosuvastatin (CRESTOR) 5 MG tablet, Take 1 tablet (5 mg total) by mouth once daily., Disp: 90  "tablet, Rfl: 3    conjugated estrogens (PREMARIN) vaginal cream, 0.5 g per vagina QHS x14 days then 2 times weekly thereafter., Disp: 30 g, Rfl: 1    estradioL (ESTRACE) 1 MG tablet, Take 1 tablet (1 mg total) by mouth once daily., Disp: 90 tablet, Rfl: 3    Review of patient's allergies indicates:   Allergen Reactions    Iodine Hives       Past Surgical History:   Procedure Laterality Date    CHOLECYSTECTOMY      Cologuard  10/2024    Negative    COLONOSCOPY      TOTAL ABDOMINAL HYSTERECTOMY       Family History   Problem Relation Name Age of Onset    Breast cancer Maternal Aunt  50    Diabetes Mother Rosanne mackey     Cancer Father Justus mackey     Stroke Maternal Grandmother Bro mendez     Colon cancer Neg Hx      Ovarian cancer Neg Hx      Uterine cancer Neg Hx      Cervical cancer Neg Hx       Social History     Socioeconomic History    Marital status:    Tobacco Use    Smoking status: Former     Current packs/day: 0.00     Types: Cigarettes     Quit date:      Years since quittin.0     Passive exposure: Current    Smokeless tobacco: Never   Substance and Sexual Activity    Alcohol use: Not Currently     Alcohol/week: 3.0 standard drinks of alcohol     Comment: Occasionally    Drug use: Never    Sexual activity: Yes     Partners: Male     Birth control/protection: See Surgical Hx       Physical Exam:  /82   Temp 96.1 °F (35.6 °C)   Ht 5' 7.01" (1.702 m)   Wt 73.3 kg (161 lb 9.6 oz)   BMI 25.30 kg/m²     Chaperone: present.       General appearance: healthy, well-nourished and well-developed     Psychiatric: Orientation to time, place and person. Normal mood and affect and active, alert     Skin: Appearance: no rashes or lesions.     Neck:   Neck: supple, FROM, trachea midline. and no masses   Thyroid: no enlargement or nodules and non-tender.       Cardiovascular:   Auscultation: RRR and no murmur.   Peripheral Vascular: no varicosities, LLE edema, RLE edema, calf tenderness, " and palpable cord and pedal pulses intact.     Lungs:   Respiratory effort: no intercostal retractions or accessory muscle usage.   Auscultation: no wheezing, rales/crackles, or rhonchi and clear to auscultation.     Breast:   Inspection/Palpation: no tenderness, discrete/distinct masses, skin changes, or abnormal secretions. Nipple appearance normal.     Abdomen:   Auscultation/Inspection/Palpation: no hepatomegaly, splenomegaly, masses, tenderness or CVA tenderness and soft, non-distended bowel sounds preset.    Hernia: no palpable hernias.     Female Genitalia:    Vulva: no masses, tenderness or lesions    Bladder/Urethra: no urethral discharge or mass, normal meatus, bladder non-distended.    Vagina: no tenderness, erythema, cystocele, rectocele, abnormal vaginal discharge or vesicle(s) or ulcers    Cuff intact, no masses, NT   Adnexa/Parametria: no parametrial tenderness or mass, no adnexal tenderness or ovarian mass.     Lymph Nodes:   Palpation: non tender submandibular nodes, axillary nodes, or inguinal nodes.     Rectal Exam:   Rectum: normal perianal skin.       Assessment/Plan:  1. Abnormal gynecological examination    2. Hormone replacement therapy  -     Discontinue: conjugated estrogens (PREMARIN) vaginal cream; 0.5 g per vagina QHS x14 days then 2 times weekly thereafter.  Dispense: 30 g; Refill: 1  -     estradioL (ESTRACE) 1 MG tablet; Take 1 tablet (1 mg total) by mouth once daily.  Dispense: 90 tablet; Refill: 3  -     conjugated estrogens (PREMARIN) vaginal cream; 0.5 g per vagina QHS x14 days then 2 times weekly thereafter.  Dispense: 30 g; Refill: 1    3. Atrophic vaginitis  -     Discontinue: conjugated estrogens (PREMARIN) vaginal cream; 0.5 g per vagina QHS x14 days then 2 times weekly thereafter.  Dispense: 30 g; Refill: 1  -     conjugated estrogens (PREMARIN) vaginal cream; 0.5 g per vagina QHS x14 days then 2 times weekly thereafter.  Dispense: 30 g; Refill: 1    4. History of total  abdominal hysterectomy      No pap, s/p hyst, no h/o abnl pap   Recommend BSE monthly  Discussed recommendations of annual screening after age of 40 with mammogram  Explained that screening is not 100% reliable. Advised patient if she notices any changes to her breast including a lump, mass, dimpling, discharge, rash, or tenderness she should contact us immediately.     Healthy diet, exercise   MMG  with PCP   Multivitamin   Seat belt   Sunscreen use   Safe sex/ STI education   Annual labs: per PCP  Colonoscopy: 09/23/2024 WNL     - Reviewed the physiologic roles of estrogen, progesterone and androgens in the female both positive and negative.   - Explained that with menopause comes a dramatic reduction in these hormones and that changes take place in their absence.   - Discussed symptoms of decreased hormone levels and that these symptoms usually last 6 months to 2 years.   - Reviewed hormone replacement options as well as indications and contraindications.   - Discussed the WHI study findings and current FDA recommendations. Also discussed current recommendations for breast screening.   - Reviewed precautions when taking female hormones and symptoms to be aware of such as headache, visual change, focal weakness or numbness, SOB,chest pain, pain in thigh or calf, breast pain or lump, and vaginal bleeding. Instructed to call immediately and stop HRT if any of these symptoms occur.   -Advised patient of increased risk of stroke, heart attack, and blood clots.   Rx: Estradiol 1mg   Rx: Premarin Vaginal Cream    RTC prn/annual    This note was transcribed by Shanti Shipman. There may be transcription errors as a result, however minimal. Effort has been made to ensure accuracy of transcription, but any obvious errors or omissions should be clarified with the author of the document.

## 2025-05-12 DIAGNOSIS — E66.09 OBESITY DUE TO EXCESS CALORIES WITHOUT SERIOUS COMORBIDITY, UNSPECIFIED CLASS: ICD-10-CM

## 2025-05-12 RX ORDER — PHENTERMINE HYDROCHLORIDE 37.5 MG/1
37.5 TABLET ORAL EVERY MORNING
Qty: 30 TABLET | Refills: 2 | Status: SHIPPED | OUTPATIENT
Start: 2025-05-12 | End: 2025-08-10

## 2025-06-10 DIAGNOSIS — E66.09 OBESITY DUE TO EXCESS CALORIES WITHOUT SERIOUS COMORBIDITY, UNSPECIFIED CLASS: ICD-10-CM

## 2025-06-10 RX ORDER — PHENTERMINE HYDROCHLORIDE 37.5 MG/1
37.5 TABLET ORAL EVERY MORNING
Qty: 30 TABLET | Refills: 2 | Status: SHIPPED | OUTPATIENT
Start: 2025-06-10 | End: 2025-09-08

## 2025-07-14 DIAGNOSIS — F41.1 GAD (GENERALIZED ANXIETY DISORDER): ICD-10-CM

## 2025-07-14 DIAGNOSIS — E11.69 TYPE 2 DIABETES MELLITUS WITH HYPERCHOLESTEROLEMIA: ICD-10-CM

## 2025-07-14 DIAGNOSIS — E66.09 OBESITY DUE TO EXCESS CALORIES WITHOUT SERIOUS COMORBIDITY, UNSPECIFIED CLASS: ICD-10-CM

## 2025-07-14 DIAGNOSIS — E78.2 MIXED HYPERLIPIDEMIA: ICD-10-CM

## 2025-07-14 DIAGNOSIS — E78.00 TYPE 2 DIABETES MELLITUS WITH HYPERCHOLESTEROLEMIA: ICD-10-CM

## 2025-07-14 DIAGNOSIS — M65.342 TRIGGER FINGER, LEFT RING FINGER: ICD-10-CM

## 2025-07-15 RX ORDER — METFORMIN HYDROCHLORIDE 500 MG/1
500 TABLET, EXTENDED RELEASE ORAL DAILY
Qty: 90 TABLET | Refills: 3 | Status: SHIPPED | OUTPATIENT
Start: 2025-07-15 | End: 2026-07-15

## 2025-07-15 RX ORDER — MELOXICAM 7.5 MG/1
7.5 TABLET ORAL 2 TIMES DAILY
Qty: 180 TABLET | Refills: 3 | Status: SHIPPED | OUTPATIENT
Start: 2025-07-15 | End: 2026-07-15

## 2025-07-15 RX ORDER — ROSUVASTATIN CALCIUM 5 MG/1
5 TABLET, COATED ORAL DAILY
Qty: 90 TABLET | Refills: 3 | Status: SHIPPED | OUTPATIENT
Start: 2025-07-15 | End: 2026-07-15

## 2025-07-15 RX ORDER — BUPROPION HYDROCHLORIDE 150 MG/1
150 TABLET ORAL DAILY
Qty: 90 TABLET | Refills: 3 | Status: SHIPPED | OUTPATIENT
Start: 2025-07-15 | End: 2026-07-15

## 2025-07-17 RX ORDER — PHENTERMINE HYDROCHLORIDE 37.5 MG/1
37.5 TABLET ORAL EVERY MORNING
Qty: 30 TABLET | Refills: 2 | Status: SHIPPED | OUTPATIENT
Start: 2025-07-17 | End: 2025-10-15

## 2025-08-08 ENCOUNTER — OFFICE VISIT (OUTPATIENT)
Dept: FAMILY MEDICINE | Facility: CLINIC | Age: 55
End: 2025-08-08
Payer: COMMERCIAL

## 2025-08-08 VITALS
BODY MASS INDEX: 25.33 KG/M2 | HEART RATE: 73 BPM | OXYGEN SATURATION: 99 % | SYSTOLIC BLOOD PRESSURE: 136 MMHG | HEIGHT: 67 IN | WEIGHT: 161.38 LBS | DIASTOLIC BLOOD PRESSURE: 72 MMHG | TEMPERATURE: 97 F

## 2025-08-08 DIAGNOSIS — Z11.59 NEED FOR HEPATITIS C SCREENING TEST: ICD-10-CM

## 2025-08-08 DIAGNOSIS — E78.2 MIXED HYPERLIPIDEMIA: ICD-10-CM

## 2025-08-08 DIAGNOSIS — E78.00 TYPE 2 DIABETES MELLITUS WITH HYPERCHOLESTEROLEMIA: Primary | ICD-10-CM

## 2025-08-08 DIAGNOSIS — E11.69 TYPE 2 DIABETES MELLITUS WITH HYPERCHOLESTEROLEMIA: Primary | ICD-10-CM

## 2025-08-08 DIAGNOSIS — I10 BENIGN ESSENTIAL HYPERTENSION: ICD-10-CM

## 2025-08-08 NOTE — PROGRESS NOTES
"SUBJECTIVE:  HPI    Phoebe Arthur is a 54 y.o. female here for Results (Lab follow up).     She denies any current problems or concerns.  She reports that her diabetic eye exam is overdue.    She denies any chest pain or shortness on breath.  She remains compliant with her medication.    Terrys allergies, medications, history, and problem list were updated as appropriate.    ROS:  Pertinent ROS as above, otherwise negative    OBJECTIVE:  Vital signs  Visit Vitals  /72 (BP Location: Right arm, Patient Position: Sitting)   Pulse 73   Temp 97.1 °F (36.2 °C) (Temporal)   Ht 5' 7.01" (1.702 m)   Wt 73.2 kg (161 lb 6.4 oz)   SpO2 99%   BMI 25.27 kg/m²          PHYSICAL EXAM:  General: Awake, alert, no acute distress  Neck:  No bruits, no masses  Cardiovascular:  Regular rhythm.  Normal rate.  No murmurs.  Respiratory: Clear to auscultation bilaterally, normal effort  Abdomen: Soft, nontender, nondistended, no hepatosplenomegaly   Extremities:  No cyanosis, no clubbing, no edema  Skin:  No rashes or appreciable lesions   Neuro:  Cranial nerves 2-12 are intact with usual testing.  Gait is normal.  Moves all 4 extremities equally and symmetrically.  Psychiatric:  Normal mood and affect.    Chemistry:  Lab Results   Component Value Date     07/25/2025    K 4.4 07/25/2025    BUN 27 (H) 07/25/2025    CREATININE 0.66 07/25/2025    EGFRNORACEVR 105 07/25/2025    CALCIUM 9.6 07/25/2025    ALKPHOS 81 03/24/2023    AST 19 07/25/2025    ALT 17 07/25/2025    TSH 1.620 06/20/2024        Lab Results   Component Value Date    HGBA1C 6.0 (H) 07/25/2025        Hematology:  Lab Results   Component Value Date    WBC 10.7 07/25/2025    HGB 14.5 07/25/2025    MCV 96 07/25/2025     07/25/2025       Lipid Panel:  Lab Results   Component Value Date    CHOL 146 07/25/2025    HDL 77 07/25/2025    LDLDIRECT 32.3 03/24/2023    TRIG 58 07/25/2025    TOTALCHOLEST 1.8 01/26/2021        ASSESSMENT/PLAN:  1. Type 2 diabetes " mellitus with hypercholesterolemia  Overview:  Lab Results   Component Value Date    HGBA1C 6.0 (H) 07/25/2025         Assessment & Plan:  On metformin  mg daily    Refer for diabetic eye exam    Orders:  -     Hemoglobin A1C; Future; Expected date: 02/08/2026  -     Microalbumin/Creatinine Ratio, Urine; Future; Expected date: 02/08/2026  -     Ambulatory referral/consult to Optometry; Future; Expected date: 08/15/2025    2. Benign essential hypertension  Assessment & Plan:  Controlled without meds at this time    Orders:  -     Comprehensive Metabolic Panel; Future; Expected date: 02/08/2026  -     CBC Auto Differential; Future; Expected date: 02/08/2026  -     TSH; Future; Expected date: 02/08/2026    3. Mixed hyperlipidemia  Overview:  Lab Results   Component Value Date    CHOL 146 07/25/2025    HDL 77 07/25/2025    LDLDIRECT 32.3 03/24/2023    TRIG 58 07/25/2025    TOTALCHOLEST 1.8 01/26/2021   LDL 57 on July 25, 2025      Assessment & Plan:  LDL cholesterol goal of less than 70     Rosuvastatin 5 mg daily    Orders:  -     Lipid Panel; Future; Expected date: 02/08/2026    4. Need for hepatitis C screening test  -     Hepatitis C Antibody; Future; Expected date: 02/08/2026            Follow Up:  Follow up in about 6 months (around 2/8/2026) for Well Adult, chronic health conditions, Fasting labs.

## 2025-08-11 ENCOUNTER — PATIENT MESSAGE (OUTPATIENT)
Dept: FAMILY MEDICINE | Facility: CLINIC | Age: 55
End: 2025-08-11
Payer: COMMERCIAL